# Patient Record
Sex: FEMALE | Race: WHITE | Employment: OTHER | URBAN - METROPOLITAN AREA
[De-identification: names, ages, dates, MRNs, and addresses within clinical notes are randomized per-mention and may not be internally consistent; named-entity substitution may affect disease eponyms.]

---

## 2017-08-21 ENCOUNTER — HOSPITAL ENCOUNTER (EMERGENCY)
Facility: CLINIC | Age: 42
Discharge: HOME | End: 2017-08-21
Attending: EMERGENCY MEDICINE | Admitting: EMERGENCY MEDICINE
Payer: COMMERCIAL

## 2017-08-21 ENCOUNTER — APPOINTMENT (OUTPATIENT)
Dept: ULTRASOUND IMAGING | Facility: CLINIC | Age: 42
End: 2017-08-21
Attending: EMERGENCY MEDICINE
Payer: COMMERCIAL

## 2017-08-21 VITALS
TEMPERATURE: 98.2 F | BODY MASS INDEX: 43.05 KG/M2 | OXYGEN SATURATION: 99 % | DIASTOLIC BLOOD PRESSURE: 89 MMHG | SYSTOLIC BLOOD PRESSURE: 123 MMHG | RESPIRATION RATE: 14 BRPM | WEIGHT: 293 LBS

## 2017-08-21 DIAGNOSIS — R19.7 DIARRHEA, UNSPECIFIED TYPE: ICD-10-CM

## 2017-08-21 DIAGNOSIS — R10.13 ABDOMINAL PAIN, EPIGASTRIC: ICD-10-CM

## 2017-08-21 DIAGNOSIS — E11.65 TYPE 2 DIABETES MELLITUS WITH HYPERGLYCEMIA, UNSPECIFIED LONG TERM INSULIN USE STATUS: ICD-10-CM

## 2017-08-21 DIAGNOSIS — E86.0 DEHYDRATION: ICD-10-CM

## 2017-08-21 LAB
ALBUMIN SERPL-MCNC: 3.8 G/DL (ref 3.4–5)
ALP SERPL-CCNC: 121 U/L (ref 40–150)
ALT SERPL W P-5'-P-CCNC: 91 U/L (ref 0–50)
ANION GAP SERPL CALCULATED.3IONS-SCNC: 8 MMOL/L (ref 3–14)
AST SERPL W P-5'-P-CCNC: 61 U/L (ref 0–45)
BASOPHILS # BLD AUTO: 0.1 10E9/L (ref 0–0.2)
BASOPHILS NFR BLD AUTO: 0.6 %
BILIRUB SERPL-MCNC: 0.3 MG/DL (ref 0.2–1.3)
BUN SERPL-MCNC: 9 MG/DL (ref 7–30)
CALCIUM SERPL-MCNC: 8.5 MG/DL (ref 8.5–10.1)
CHLORIDE SERPL-SCNC: 109 MMOL/L (ref 94–109)
CO2 SERPL-SCNC: 22 MMOL/L (ref 20–32)
CREAT SERPL-MCNC: 0.57 MG/DL (ref 0.52–1.04)
DIFFERENTIAL METHOD BLD: ABNORMAL
EOSINOPHIL # BLD AUTO: 0.2 10E9/L (ref 0–0.7)
EOSINOPHIL NFR BLD AUTO: 2.1 %
ERYTHROCYTE [DISTWIDTH] IN BLOOD BY AUTOMATED COUNT: 13.4 % (ref 10–15)
GFR SERPL CREATININE-BSD FRML MDRD: >90 ML/MIN/1.7M2
GLUCOSE SERPL-MCNC: 176 MG/DL (ref 70–99)
HCG SERPL QL: NEGATIVE
HCT VFR BLD AUTO: 49.3 % (ref 35–47)
HGB BLD-MCNC: 15.8 G/DL (ref 11.7–15.7)
IMM GRANULOCYTES # BLD: 0.1 10E9/L (ref 0–0.4)
IMM GRANULOCYTES NFR BLD: 0.6 %
LIPASE SERPL-CCNC: 119 U/L (ref 73–393)
LYMPHOCYTES # BLD AUTO: 3.3 10E9/L (ref 0.8–5.3)
LYMPHOCYTES NFR BLD AUTO: 36.4 %
MCH RBC QN AUTO: 26.7 PG (ref 26.5–33)
MCHC RBC AUTO-ENTMCNC: 32 G/DL (ref 31.5–36.5)
MCV RBC AUTO: 83 FL (ref 78–100)
MONOCYTES # BLD AUTO: 0.5 10E9/L (ref 0–1.3)
MONOCYTES NFR BLD AUTO: 5.3 %
NEUTROPHILS # BLD AUTO: 5 10E9/L (ref 1.6–8.3)
NEUTROPHILS NFR BLD AUTO: 55 %
NRBC # BLD AUTO: 0 10*3/UL
NRBC BLD AUTO-RTO: 0 /100
PLATELET # BLD AUTO: 290 10E9/L (ref 150–450)
POTASSIUM SERPL-SCNC: 3.5 MMOL/L (ref 3.4–5.3)
PROT SERPL-MCNC: 7.6 G/DL (ref 6.8–8.8)
RBC # BLD AUTO: 5.92 10E12/L (ref 3.8–5.2)
SODIUM SERPL-SCNC: 139 MMOL/L (ref 133–144)
WBC # BLD AUTO: 9 10E9/L (ref 4–11)

## 2017-08-21 PROCEDURE — 85025 COMPLETE CBC W/AUTO DIFF WBC: CPT | Performed by: EMERGENCY MEDICINE

## 2017-08-21 PROCEDURE — 76705 ECHO EXAM OF ABDOMEN: CPT

## 2017-08-21 PROCEDURE — 80053 COMPREHEN METABOLIC PANEL: CPT | Performed by: EMERGENCY MEDICINE

## 2017-08-21 PROCEDURE — 99284 EMERGENCY DEPT VISIT MOD MDM: CPT | Mod: 25

## 2017-08-21 PROCEDURE — 83690 ASSAY OF LIPASE: CPT | Performed by: EMERGENCY MEDICINE

## 2017-08-21 PROCEDURE — 96374 THER/PROPH/DIAG INJ IV PUSH: CPT

## 2017-08-21 PROCEDURE — 25000128 H RX IP 250 OP 636: Performed by: EMERGENCY MEDICINE

## 2017-08-21 PROCEDURE — 96361 HYDRATE IV INFUSION ADD-ON: CPT

## 2017-08-21 PROCEDURE — 84703 CHORIONIC GONADOTROPIN ASSAY: CPT | Performed by: EMERGENCY MEDICINE

## 2017-08-21 RX ORDER — ONDANSETRON 2 MG/ML
4 INJECTION INTRAMUSCULAR; INTRAVENOUS ONCE
Status: COMPLETED | OUTPATIENT
Start: 2017-08-21 | End: 2017-08-21

## 2017-08-21 RX ORDER — SODIUM CHLORIDE 9 MG/ML
1000 INJECTION, SOLUTION INTRAVENOUS CONTINUOUS
Status: DISCONTINUED | OUTPATIENT
Start: 2017-08-21 | End: 2017-08-21 | Stop reason: HOSPADM

## 2017-08-21 RX ORDER — ONDANSETRON 4 MG/1
4 TABLET, FILM COATED ORAL EVERY 8 HOURS PRN
Qty: 10 TABLET | Refills: 0 | Status: SHIPPED | OUTPATIENT
Start: 2017-08-21 | End: 2019-08-02

## 2017-08-21 RX ADMIN — ONDANSETRON 4 MG: 2 INJECTION INTRAMUSCULAR; INTRAVENOUS at 11:26

## 2017-08-21 RX ADMIN — SODIUM CHLORIDE 1000 ML: 9 INJECTION, SOLUTION INTRAVENOUS at 11:21

## 2017-08-21 ASSESSMENT — ENCOUNTER SYMPTOMS
ABDOMINAL PAIN: 1
VOMITING: 0
LIGHT-HEADEDNESS: 0
DIARRHEA: 1
CHILLS: 1
NAUSEA: 1
DIZZINESS: 1
WEAKNESS: 1
FEVER: 0

## 2017-08-21 NOTE — ED PROVIDER NOTES
History     Chief Complaint:  Nausea and Diarrhea    HPI   Winnie Ibarra is a 42 year old female smoker with a history of diabetes mellitus who presents to the emergency department today for evaluation of nausea and diarrhea. The patient reports that on Friday she developed nausea as well as diarrhea that was a beige-yellow color, foamy, and watery. She also states that she has had the chills and upper abdominal cramping and felt feverish, weak, shaky, dizzy, and emotional. She denies vomiting, lightheadedness, or fevers and reports no recent antibiotics or travel. She reports that she is currently on her menstrual period. She notes that her son was recently ill with two episodes of vomiting and diarrhea that was much shorter in duration than her illness. Of note, the patient is hypertensive upon arrival in the emergency department and reports that she has only been noted to be hypertensive towards the end of her pregnancy. The patient reports concern for dehydration and states that she has not been checking her blood sugars regularly.    Allergies:  Adhesive Tape  Erythromycin     Medications:    Metformin HCl  Norco  Ibuprofen    Past Medical History:    Abnormal pap smear  Asthma  Diabetes mellitus  Endometriosis  Gastro-esophageal reflux disease  Pregnancy induced hypertension    Past Surgical History:    Appendectomy  Bladder surgery   section  Knee surgery  Shoulder surgery  Tonsillectomy  Wrist surgery    Family History:    History reviewed. No pertinent family history.    Social History:  The patient was accompanied to the ED by her son.  Smoking Status: Current Every Day Smoker  Alcohol Use: Negative  Marital Status:  Single     Review of Systems   Constitutional: Positive for chills. Negative for fever.        Positive for feverish.   Gastrointestinal: Positive for abdominal pain (upper), diarrhea and nausea. Negative for vomiting.   Neurological: Positive for dizziness and weakness.  "Negative for light-headedness.        Positive for shakiness.   Psychiatric/Behavioral:        Positive for feeling more \"emotional\".   All other systems reviewed and are negative.    Physical Exam     Vital signs:  Temp: 98.2  F (36.8  C)  Temp src: Oral   BP: 123/89   Repeat BP (at 1225): 123/89  Heart Rate: 99   Resp: 14  Weight: 136.1 kg (300 lb)  Estimated body mass index is 43.05 kg/(m^2) as calculated from the following:    Height as of 4/4/15: 1.778 m (5' 10\").    Weight as of this encounter: 136.1 kg (300 lb).    Physical Exam    General: The patient is alert, in no respiratory distress.    HENT: Mucous membranes dry.    Cardiovascular: Regular rate and rhythm. Good pulses in all four extremities. Normal capillary refill and skin turgor.     Respiratory: Lungs are clear. No nasal flaring. No retractions. No wheezing, no crackles.    Gastrointestinal: Abdomen soft. Mild epigastric tenderness. No guarding, no rebound. No palpable hernias.     Musculoskeletal: No gross deformity.     Skin: No rashes or petechiae.     Neurologic: The patient is alert and oriented x3. GCS 15. No testable cranial nerve deficit. Follows commands with clear and appropriate speech. Gives appropriate answers. Good strength in all extremities. No gross neurologic deficit. Gross sensation intact. Pupils are round and reactive. No meningismus.     Lymphatic: No cervical adenopathy. No lower extremity swelling.    Psychiatric: The patient is tearful.    Emergency Department Course     Imaging:  Radiology findings were communicated with the patient who voiced understanding of the findings.    Abdomen US, limited (RUQ only)  1. Moderate diffuse fatty infiltration of the liver.  2. Unremarkable appearance of the gallbladder.  3. No biliary dilatation.  DARLING MEEKS MD  Reading per radiology    Laboratory:  Laboratory findings were communicated with the patient who voiced understanding of the findings.    CBC: WBC 9.0, HGB 15.8 (H), PLT " 290  CMP: Glucose 176 (H), ALT 91 (H), AST 61 (H), o/w WNL (Creatinine 0.57)  Lipase: 119  HCG Qualitative: Negative    Interventions:  1126 Zofran 4 mg IV  1121 NS 1000 mLs IV    Emergency Department Course:  Nursing notes and vitals reviewed.  I performed an exam of the patient as documented above.   IV was inserted and blood was drawn for laboratory testing, results above.  The patient was sent for a Abdomen US, limited (RUQ only) while in the emergency department, results above.   1110 I discussed the patient's elevated blood pressure with her during my initial examination.  1210 I rechecked the patient, who reports she is feeling better. She notes a family history of liver cancer and gall bladder attacks. Her LFT's are elevated at this time so Abdomen US was obtained.  1330 I discussed the treatment plan with the patient. They expressed understanding of this plan and consented to discharge. They will be discharged home with instructions for care and follow up. In addition, the patient will return to the emergency department if their symptoms persist, worsen, if new symptoms arise or if there is any concern.  All questions were answered.  I personally reviewed the imaging and laboratory results with the patient and answered all related questions prior to discharge.    Impression & Plan      Medical Decision Making:  The patient reports that her son has been sick as well at the same time. She complains of abdominal pain with diarrhea and appears to be dehydrated. The patient was hydrated here with IV fluids, feeling much better. I did consider abdominal causes including pancreatitis, intra-thoracic causes, and other causes like biliary colic, UTI. The patient, however, is stable here. We discussed all her results. She is feeling much better after the IV fluids and was discharged home to close follow up and I do not feel that the diarrhea is likely bacterial.    Diagnosis:    ICD-10-CM    1. Abdominal pain,  epigastric R10.13    2. Diarrhea, unspecified type R19.7    3. Dehydration E86.0    4. Type 2 diabetes mellitus with hyperglycemia, unspecified long term insulin use status (H) E11.65      Disposition:  The patient is discharged to home.    Discharge Medications:  Discharge Medication List as of 8/21/2017  1:42 PM      START taking these medications    Details   ondansetron (ZOFRAN) 4 MG tablet Take 1 tablet (4 mg) by mouth every 8 hours as needed for nausea, Disp-10 tablet, R-0, Local Print           Scribe Disclosure:  I, Aristeo Avila, am serving as a scribe at 11:03 AM on 8/21/2017 to document services personally performed by Alexander Agosto MD based on my observations and the provider's statements to me.  [facility] EMERGENCY DEPARTMENT       Alexander Agosto MD  08/22/17 [id number]

## 2017-08-21 NOTE — ED AVS SNAPSHOT
[facility] Emergency Department    [address]    [facility] [address]    Phone:  [phone]    Fax:  [phone]                                       Winnie Ibarra   MRN: [id number]    Department:  [facility] Emergency Department   Date of Visit:  8/21/2017           After Visit Summary Signature Page     I have received my discharge instructions, and my questions have been answered. I have discussed any challenges I see with this plan with the nurse or doctor.    ..........................................................................................................................................  Patient/Patient Representative Signature      ..........................................................................................................................................  Patient Representative Print Name and Relationship to Patient    ..................................................               ................................................  Date                                            Time    ..........................................................................................................................................  Reviewed by Signature/Title    ...................................................              ..............................................  Date                                                            Time

## 2017-08-21 NOTE — ED AVS SNAPSHOT
[facility] Emergency Department    [address] [place] [address]    Phone:  [phone]    Fax:  [phone]                                       Winnie Ibarra   MRN: [id number]    Department:  [facility] Emergency Department   Date of Visit:  8/21/2017           Patient Information     Date Of Birth          1975        Your diagnoses for this visit were:     Abdominal pain, epigastric     Diarrhea, unspecified type     Dehydration     Type 2 diabetes mellitus with hyperglycemia, unspecified long term insulin use status (H)        You were seen by Alexander Agosto MD.      Follow-up Information     Follow up with [facility], [facility]. Schedule an appointment as soon as possible for a visit in 3 days.    Contact information:    [address] [place] [id number]  [phone]          Discharge Instructions       Discharge Instructions  Adult Diarrhea    You have been seen today for diarrhea. This is usually caused by a virus, but some bacteria, parasites, medicines or other medical conditions can cause similar symptoms. At this time your doctor does not find that your diarrhea is a sign of anything dangerous or life-threatening. However, sometimes the signs of serious illness do not show up right away. If you have new or worse symptoms, you may need to be seen again in the Emergency Department or by your primary doctor.     Return to the Emergency Department if:    You feel you are getting dehydrated, such as being very thirsty, not urinating at least every 8-12 hours, or feeling faint or lightheaded.     You develop a new fever, or your fever continues for more than 2 days.     You have belly pain that seems worse than cramps, is in one spot, or is getting worse over time.     You have blood in your stool or your stool becomes black.  (Remember that if you take Pepto-Bismol , this will turn your stool black).     You feel very weak.    You are  "not starting to improve within 24 hours of your visit here.    What can I do to help myself?    The most important thing to do is to drink clear liquids.   It is best to have only small, frequent sips of liquids. Drinking too much at once may cause more diarrhea. You should also replace minerals, sodium and potassium lost with diarrhea. Pedialyte  and sports drinks can help you replace these minerals. You can also drink clear liquids such as water, weak tea, apple juice, and 7-Up . Avoid acid liquids (orange), caffeine (coffee) or alcohol. Milk products will make the diarrhea worse.     Eat only bland foods. Soda crackers, toast, plain noodles, gelatin, applesauce and bananas are good first choices. Avoid foods that have acid, are spicy, fatty or fibrous (such as meats, coarse grains, vegetables). You may start eating these foods again in about 3 days when you are better.     Sometimes treatment includes prescription medicine to prevent diarrhea. If your doctor prescribes these for you, take them as directed.     Nonprescription medicine is available for the treatment of diarrhea and can be very effective. If you use it, make sure you use the dose recommended on the package. Check with your healthcare provider before you use any medicine for diarrhea.     Don t take ibuprofen, or other nonsteroidal anti-inflammatory medicines without checking with your healthcare provider.   Probiotics: If you have been given an antibiotic, you may want to also take a probiotic pill or eat yogurt with live cultures. Probiotics have \"good bacteria\" to help your intestines stay healthy. Studies have shown that probiotics help prevent diarrhea and other intestine problems (including C. diff infection) when you take antibiotics. You can buy these without a prescription in the pharmacy section of the store.   If you were given a prescription for medicine here today, be sure to read all of the information (including the package insert) " that comes with your prescription.  This will include important information about the medicine, its side effects, and any warnings that you need to know about.  The pharmacist who fills the prescription can provide more information and answer questions you may have about the medicine.  If you have questions or concerns that the pharmacist cannot address, please call or return to the Emergency Department.   Opioid Medication Information    Pain medications are among the most commonly prescribed medicines, so we are including this information for all our patients. If you did not receive pain medication or get a prescription for pain medicine, you can ignore it.     You may have been given a prescription for an opioid (narcotic) pain medicine and/or have received a pain medicine while here in the Emergency Department. These medicines can make you drowsy or impaired. You must not drive, operate dangerous equipment, or engage in any other dangerous activities while taking these medications. If you drive while taking these medications, you could be arrested for DUI, or driving under the influence. Do not drink any alcohol while you are taking these medications.     Opioid pain medications can cause addiction. If you have a history of chemical dependency of any type, you are at a higher risk of becoming addicted to pain medications.  Only take these prescribed medications to treat your pain when all other options have been tried. Take it for as short a time and as few doses as possible. Store your pain pills in a secure place, as they are frequently stolen and provide a dangerous opportunity for children or visitors in your house to start abusing these powerful medications. We will not replace any lost or stolen medicine.  As soon as your pain is better, you should flush all your remaining medication.     Many prescription pain medications contain Tylenol  (acetaminophen), including Vicodin , Tylenol #3 , Norco , Lortab ,  and Percocet .  You should not take any extra pills of Tylenol  if you are using these prescription medications or you can get very sick.  Do not ever take more than 3000 mg of acetaminophen in any 24 hour period.    All opioids tend to cause constipation. Drink plenty of water and eat foods that have a lot of fiber, such as fruits, vegetables, prune juice, apple juice and high fiber cereal.  Take a laxative if you don t move your bowels at least every other day. Miralax , Milk of Magnesia, Colace , or Senna  can be used to keep you regular.      Remember that you can always come back to the Emergency Department if you are not able to see your regular doctor in the amount of time listed above, if you get any new symptoms, or if there is anything that worries you.        24 Hour Appointment Hotline       To make an appointment at any [facility], call [phone]). If you don't have a family doctor or clinic, we will help you find one. [facility] clinics are conveniently located to serve the needs of you and your family.             Review of your medicines      START taking        Dose / Directions Last dose taken    ondansetron 4 MG tablet   Commonly known as:  ZOFRAN   Dose:  4 mg   Quantity:  10 tablet        Take 1 tablet (4 mg) by mouth every 8 hours as needed for nausea   Refills:  0          Our records show that you are taking the medicines listed below. If these are incorrect, please call your family doctor or clinic.        Dose / Directions Last dose taken    LUKAS CONTOUR TEST VI        by In Vitro route.   Refills:  0        CONTOUR BLOOD GLUCOSE SYSTEM Rosmery        Refills:  0        GLUMETZA PO        Refills:  0        HYDROcodone-acetaminophen 5-325 MG per tablet   Commonly known as:  NORCO   Dose:  1-2 tablet   Quantity:  9 tablet        Take 1-2 tablets by mouth every 4 hours as needed for moderate to severe pain   Refills:  0        ibuprofen 400-800 mg tablet   Commonly known  as:  ADVIL,MOTRIN   Dose:  400-800 mg   Quantity:  90 tablet        Take 1-2 tablets by mouth every 6 hours as needed (cramping).   Refills:  0        MICROLET LANCETS Misc        Refills:  0        TRULICITY SC        Refills:  0                Prescriptions were sent or printed at these locations (1 Prescription)                   Other Prescriptions                Printed at Department/Unit printer (1 of 1)         ondansetron (ZOFRAN) 4 MG tablet                Procedures and tests performed during your visit     Abdomen US, limited ([facility]Q only)    CBC with platelets differential    Comprehensive metabolic panel    HCG QUALitative    Lipase    Peripheral IV: Standard    Pulse oximetry nursing    Vital signs      Orders Needing Specimen Collection     None      Pending Results     Date and Time Order Name Status Description    8/21/2017 1215 Abdomen US, limited ([facility]Q only) Preliminary             Pending Culture Results     No orders found from 8/19/2017 to 8/22/2017.            Pending Results Instructions     If you had any lab results that were not finalized at the time of your Discharge, you can call the ED Lab Result RN at [phone]. You will be contacted by this team for any positive Lab results or changes in treatment. The nurses are available 7 days a week from 10A to 6:30P.  You can leave a message 24 hours per day and they will return your call.        Test Results From Your Hospital Stay        8/21/2017 11:35 AM      Component Results     Component Value Ref Range & Units Status    WBC 9.0 4.0 - 11.0 10e9/L Final    RBC Count 5.92 (H) 3.8 - 5.2 10e12/L Final    Hemoglobin 15.8 (H) 11.7 - 15.7 g/dL Final    Hematocrit 49.3 (H) 35.0 - 47.0 % Final    MCV 83 78 - 100 fl Final    MCH 26.7 26.5 - 33.0 pg Final    MCHC 32.0 31.5 - 36.5 g/dL Final    RDW 13.4 10.0 - 15.0 % Final    Platelet Count 290 150 - 450 10e9/L Final    Diff Method Automated Method  Final    % Neutrophils 55.0 % Final    % Lymphocytes  36.4 % Final    % Monocytes 5.3 % Final    % Eosinophils 2.1 % Final    % Basophils 0.6 % Final    % Immature Granulocytes 0.6 % Final    Nucleated RBCs 0 0 /100 Final    Absolute Neutrophil 5.0 1.6 - 8.3 10e9/L Final    Absolute Lymphocytes 3.3 0.8 - 5.3 10e9/L Final    Absolute Monocytes 0.5 0.0 - 1.3 10e9/L Final    Absolute Eosinophils 0.2 0.0 - 0.7 10e9/L Final    Absolute Basophils 0.1 0.0 - 0.2 10e9/L Final    Abs Immature Granulocytes 0.1 0 - 0.4 10e9/L Final    Absolute Nucleated RBC 0.0  Final         8/21/2017 11:52 AM      Component Results     Component Value Ref Range & Units Status    Sodium 139 133 - 144 mmol/L Final    Potassium 3.5 3.4 - 5.3 mmol/L Final    Chloride 109 94 - 109 mmol/L Final    Carbon Dioxide 22 20 - 32 mmol/L Final    Anion Gap 8 3 - 14 mmol/L Final    Glucose 176 (H) 70 - 99 mg/dL Final    Urea Nitrogen 9 7 - 30 mg/dL Final    Creatinine 0.57 0.52 - 1.04 mg/dL Final    GFR Estimate >90 >60 mL/min/1.7m2 Final    Non  GFR Calc    GFR Estimate If Black >90 >60 mL/min/1.7m2 Final    African American GFR Calc    Calcium 8.5 8.5 - 10.1 mg/dL Final    Bilirubin Total 0.3 0.2 - 1.3 mg/dL Final    Albumin 3.8 3.4 - 5.0 g/dL Final    Protein Total 7.6 6.8 - 8.8 g/dL Final    Alkaline Phosphatase 121 40 - 150 U/L Final    ALT 91 (H) 0 - 50 U/L Final    AST 61 (H) 0 - 45 U/L Final         8/21/2017 11:52 AM      Component Results     Component Value Ref Range & Units Status    Lipase 119 73 - 393 U/L Final         8/21/2017 11:54 AM      Component Results     Component Value Ref Range & Units Status    HCG Qualitative Serum Negative NEG^Negative Final    This test is for screening purposes.  Results should be interpreted along with   the clinical picture.  Confirmation testing is available if warranted by   ordering [id number], HCG Quantitative Pregnancy.           8/21/2017  1:17 PM      Narrative     ULTRASOUND ABDOMEN LIMITED RIGHT UPPER QUADRANT August 21, 2017 1:07  PM      HISTORY: Abdominal pain. Diarrhea. Elevated liver function tests.    COMPARISON: 8/24/2009-CT abdomen and pelvis.    FINDINGS: Moderate diffuse increased hepatic echogenicity, likely  representing fatty infiltration. No hepatic masses. The gallbladder is  unremarkable without gallstones, wall thickening or pericholecystic  fluid. No focal tenderness over the gallbladder. No intra or  extrahepatic biliary dilatation. The common duct measures 0.3 cm in  diameter. The right kidney has normal echogenicity, measuring 14.1 cm  in length. No right intrarenal collecting system dilatation, calculi  or masses. No free fluid in the upper right hemiabdomen.        Impression     IMPRESSION:   1. Moderate diffuse fatty infiltration of the liver.  2. Unremarkable appearance of the gallbladder.  3. No biliary dilatation.                Clinical Quality Measure: Blood Pressure Screening     Your blood pressure was checked while you were in the emergency department today. The last reading we obtained was  BP: 123/89 . Please read the guidelines below about what these numbers mean and what you should do about them.  If your systolic blood pressure (the top number) is less than 120 and your diastolic blood pressure (the bottom number) is less than 80, then your blood pressure is normal. There is nothing more that you need to do about it.  If your systolic blood pressure (the top number) is 120-139 or your diastolic blood pressure (the bottom number) is 80-89, your blood pressure may be higher than it should be. You should have your blood pressure rechecked within a year by a primary care provider.  If your systolic blood pressure (the top number) is 140 or greater or your diastolic blood pressure (the bottom number) is 90 or greater, you may have high blood pressure. High blood pressure is treatable, but if left untreated over time it can put you at risk for heart attack, stroke, or kidney failure. You should have your blood  "pressure rechecked by a primary care provider within the next 4 weeks.  If your provider in the emergency department today gave you specific instructions to follow-up with your doctor or provider even sooner than that, you should follow that instruction and not wait for up to 4 weeks for your follow-up visit.        Thank you for choosing [facility]       Thank you for choosing [facility] for your care. Our goal is always to provide you with excellent care. Hearing back from our patients is one way we can continue to improve our services. Please take a few minutes to complete the written survey that you may receive in the mail after you visit with us. Thank you!        [employer] Information     [employer] lets you send messages to your doctor, view your test results, renew your prescriptions, schedule appointments and more. To sign up, go to [email or web address] . Click on \"Log in\" on the left side of the screen, which will take you to the Welcome page. Then click on \"Sign up Now\" on the right side of the page.     You will be asked to enter the access code listed below, as well as some personal information. Please follow the directions to create your username and password.     Your access code is: [email or web address]  Expires: 2017  1:41 PM     Your access code will  in 90 days. If you need help or a new code, please call your [facility] clinic or [phone].        Care EveryWhere ID     This is your Care EveryWhere ID. This could be used by other organizations to access your [facility] medical records  [id number]        Equal Access to Services     CHACHO NOVAK : Hadii buck daigle hadasho Soelyssaali, waaxda luqadaha, qaybta kaalmada frederic, susana peguero . So [facility] [phone].    ATENCIÓN: Si habla español, tiene a stiles disposición servicios gratuitos de asistencia lingüística. Llame al [phone].    We comply with applicable federal civil rights laws and [place] laws. We do not " discriminate on the basis of race, color, national origin, age, disability sex, sexual orientation or gender identity.            After Visit Summary       This is your record. Keep this with you and show to your community pharmacist(s) and doctor(s) at your next visit.

## 2018-05-10 ENCOUNTER — APPOINTMENT (OUTPATIENT)
Dept: CT IMAGING | Facility: CLINIC | Age: 43
End: 2018-05-10
Attending: EMERGENCY MEDICINE
Payer: MEDICAID

## 2018-05-10 ENCOUNTER — HOSPITAL ENCOUNTER (EMERGENCY)
Facility: CLINIC | Age: 43
Discharge: HOME | End: 2018-05-10
Attending: EMERGENCY MEDICINE | Admitting: EMERGENCY MEDICINE
Payer: MEDICAID

## 2018-05-10 VITALS
OXYGEN SATURATION: 93 % | RESPIRATION RATE: 16 BRPM | HEART RATE: 89 BPM | TEMPERATURE: 97.5 F | SYSTOLIC BLOOD PRESSURE: 135 MMHG | DIASTOLIC BLOOD PRESSURE: 87 MMHG

## 2018-05-10 DIAGNOSIS — R10.9 FLANK PAIN: ICD-10-CM

## 2018-05-10 LAB
ALBUMIN SERPL-MCNC: 3.2 G/DL (ref 3.4–5)
ALBUMIN UR-MCNC: 30 MG/DL
ALP SERPL-CCNC: 104 U/L (ref 40–150)
ALT SERPL W P-5'-P-CCNC: 32 U/L (ref 0–50)
ANION GAP SERPL CALCULATED.3IONS-SCNC: 6 MMOL/L (ref 3–14)
APPEARANCE UR: ABNORMAL
AST SERPL W P-5'-P-CCNC: 24 U/L (ref 0–45)
BASOPHILS # BLD AUTO: 0 10E9/L (ref 0–0.2)
BASOPHILS NFR BLD AUTO: 0.4 %
BILIRUB SERPL-MCNC: 0.3 MG/DL (ref 0.2–1.3)
BILIRUB UR QL STRIP: NEGATIVE
BUN SERPL-MCNC: 8 MG/DL (ref 7–30)
CALCIUM SERPL-MCNC: 8.2 MG/DL (ref 8.5–10.1)
CHLORIDE SERPL-SCNC: 107 MMOL/L (ref 94–109)
CO2 SERPL-SCNC: 24 MMOL/L (ref 20–32)
COLOR UR AUTO: YELLOW
CREAT SERPL-MCNC: 0.48 MG/DL (ref 0.52–1.04)
DIFFERENTIAL METHOD BLD: ABNORMAL
EOSINOPHIL # BLD AUTO: 0.1 10E9/L (ref 0–0.7)
EOSINOPHIL NFR BLD AUTO: 1.6 %
ERYTHROCYTE [DISTWIDTH] IN BLOOD BY AUTOMATED COUNT: 12.8 % (ref 10–15)
GFR SERPL CREATININE-BSD FRML MDRD: >90 ML/MIN/1.7M2
GLUCOSE SERPL-MCNC: 175 MG/DL (ref 70–99)
GLUCOSE UR STRIP-MCNC: 50 MG/DL
HCG UR QL: NEGATIVE
HCT VFR BLD AUTO: 43.3 % (ref 35–47)
HGB BLD-MCNC: 14.2 G/DL (ref 11.7–15.7)
HGB UR QL STRIP: NEGATIVE
IMM GRANULOCYTES # BLD: 0 10E9/L (ref 0–0.4)
IMM GRANULOCYTES NFR BLD: 0.5 %
KETONES UR STRIP-MCNC: NEGATIVE MG/DL
LEUKOCYTE ESTERASE UR QL STRIP: NEGATIVE
LIPASE SERPL-CCNC: 254 U/L (ref 73–393)
LYMPHOCYTES # BLD AUTO: 2.8 10E9/L (ref 0.8–5.3)
LYMPHOCYTES NFR BLD AUTO: 34.5 %
MCH RBC QN AUTO: 27 PG (ref 26.5–33)
MCHC RBC AUTO-ENTMCNC: 32.8 G/DL (ref 31.5–36.5)
MCV RBC AUTO: 82 FL (ref 78–100)
MONOCYTES # BLD AUTO: 0.5 10E9/L (ref 0–1.3)
MONOCYTES NFR BLD AUTO: 6 %
MUCOUS THREADS #/AREA URNS LPF: PRESENT /LPF
NEUTROPHILS # BLD AUTO: 4.6 10E9/L (ref 1.6–8.3)
NEUTROPHILS NFR BLD AUTO: 57 %
NITRATE UR QL: NEGATIVE
NRBC # BLD AUTO: 0 10*3/UL
NRBC BLD AUTO-RTO: 0 /100
PH UR STRIP: 5 PH (ref 5–7)
PLATELET # BLD AUTO: 229 10E9/L (ref 150–450)
POTASSIUM SERPL-SCNC: 4 MMOL/L (ref 3.4–5.3)
PROT SERPL-MCNC: 6.9 G/DL (ref 6.8–8.8)
RBC # BLD AUTO: 5.26 10E12/L (ref 3.8–5.2)
RBC #/AREA URNS AUTO: 1 /HPF (ref 0–2)
SODIUM SERPL-SCNC: 137 MMOL/L (ref 133–144)
SOURCE: ABNORMAL
SP GR UR STRIP: 1.02 (ref 1–1.03)
SQUAMOUS #/AREA URNS AUTO: 1 /HPF (ref 0–1)
UROBILINOGEN UR STRIP-MCNC: 2 MG/DL (ref 0–2)
WBC # BLD AUTO: 8 10E9/L (ref 4–11)
WBC #/AREA URNS AUTO: 1 /HPF (ref 0–5)

## 2018-05-10 PROCEDURE — 85025 COMPLETE CBC W/AUTO DIFF WBC: CPT | Performed by: EMERGENCY MEDICINE

## 2018-05-10 PROCEDURE — 25000132 ZZH RX MED GY IP 250 OP 250 PS 637: Performed by: PHYSICIAN ASSISTANT

## 2018-05-10 PROCEDURE — 99285 EMERGENCY DEPT VISIT HI MDM: CPT | Mod: 25

## 2018-05-10 PROCEDURE — 83690 ASSAY OF LIPASE: CPT | Performed by: EMERGENCY MEDICINE

## 2018-05-10 PROCEDURE — 81001 URINALYSIS AUTO W/SCOPE: CPT | Performed by: EMERGENCY MEDICINE

## 2018-05-10 PROCEDURE — 81025 URINE PREGNANCY TEST: CPT | Performed by: EMERGENCY MEDICINE

## 2018-05-10 PROCEDURE — 74176 CT ABD & PELVIS W/O CONTRAST: CPT

## 2018-05-10 PROCEDURE — 96374 THER/PROPH/DIAG INJ IV PUSH: CPT

## 2018-05-10 PROCEDURE — 80053 COMPREHEN METABOLIC PANEL: CPT | Performed by: EMERGENCY MEDICINE

## 2018-05-10 PROCEDURE — 25000128 H RX IP 250 OP 636: Performed by: EMERGENCY MEDICINE

## 2018-05-10 RX ORDER — IBUPROFEN 600 MG/1
600 TABLET, FILM COATED ORAL ONCE
Status: COMPLETED | OUTPATIENT
Start: 2018-05-10 | End: 2018-05-10

## 2018-05-10 RX ORDER — OXYCODONE AND ACETAMINOPHEN 5; 325 MG/1; MG/1
1-2 TABLET ORAL EVERY 4 HOURS PRN
Qty: 12 TABLET | Refills: 0 | Status: SHIPPED | OUTPATIENT
Start: 2018-05-10 | End: 2019-08-02

## 2018-05-10 RX ORDER — CYCLOBENZAPRINE HCL 10 MG
10 TABLET ORAL 3 TIMES DAILY PRN
Qty: 20 TABLET | Refills: 0 | Status: SHIPPED | OUTPATIENT
Start: 2018-05-10 | End: 2018-05-16

## 2018-05-10 RX ORDER — HYDROCODONE BITARTRATE AND ACETAMINOPHEN 5; 325 MG/1; MG/1
2 TABLET ORAL ONCE
Status: COMPLETED | OUTPATIENT
Start: 2018-05-10 | End: 2018-05-10

## 2018-05-10 RX ORDER — HYDROMORPHONE HYDROCHLORIDE 1 MG/ML
0.5 INJECTION, SOLUTION INTRAMUSCULAR; INTRAVENOUS; SUBCUTANEOUS
Status: DISCONTINUED | OUTPATIENT
Start: 2018-05-10 | End: 2018-05-10 | Stop reason: HOSPADM

## 2018-05-10 RX ADMIN — HYDROMORPHONE HYDROCHLORIDE 0.5 MG: 1 INJECTION, SOLUTION INTRAMUSCULAR; INTRAVENOUS; SUBCUTANEOUS at 11:37

## 2018-05-10 RX ADMIN — IBUPROFEN 600 MG: 600 TABLET ORAL at 09:54

## 2018-05-10 RX ADMIN — HYDROCODONE BITARTRATE AND ACETAMINOPHEN 2 TABLET: 5; 325 TABLET ORAL at 09:54

## 2018-05-10 ASSESSMENT — ENCOUNTER SYMPTOMS
DYSURIA: 0
FLANK PAIN: 1
DIAPHORESIS: 1
FEVER: 1
HEMATURIA: 0
VOMITING: 0
NAUSEA: 1
DIARRHEA: 1
CHILLS: 1
ABDOMINAL PAIN: 1

## 2018-05-10 NOTE — ED AVS SNAPSHOT
[facility] Emergency Department    [address]    [facility] [address]    Phone:  [phone]    Fax:  [phone]                                       Winnie Ibarra   MRN: [id number]    Department:  [facility] Emergency Department   Date of Visit:  5/10/2018           After Visit Summary Signature Page     I have received my discharge instructions, and my questions have been answered. I have discussed any challenges I see with this plan with the nurse or doctor.    ..........................................................................................................................................  Patient/Patient Representative Signature      ..........................................................................................................................................  Patient Representative Print Name and Relationship to Patient    ..................................................               ................................................  Date                                            Time    ..........................................................................................................................................  Reviewed by Signature/Title    ...................................................              ..............................................  Date                                                            Time

## 2018-05-10 NOTE — ED AVS SNAPSHOT
[facility] Emergency Department    [address] [place] [address]    Phone:  [phone]    Fax:  [phone]                                       Winnie Ibarra   MRN: [id number]    Department:  [facility] Emergency Department   Date of Visit:  5/10/2018           Patient Information     Date Of Birth          1975        Your diagnoses for this visit were:     Flank pain Likely muscular strain       You were seen by Dino Larson MD.      Follow-up Information     Follow up with Clinic, [facility] In 1 day.    Why:  If symptoms have not improved or they worsen    Contact information:    [address] [facility] [address]  [phone]          Discharge Instructions       Use heat, pain medications, and gentle stretching for back pain. Increase fluids with pain medications to prevent constipation. Do not drive or operate machinery while on the pain medications.       Discharge Instructions  Back Pain  You were seen today for back pain. Back pain can have many causes, but most will get better without surgery or other specific treatment. Sometimes there is a herniated ( slipped ) disc. We don t usually do MRI scans to look for these right away, since most herniated discs will get better on their own with time.  Today, we did not find any evidence that your back pain was caused by a serious condition, such as an infection, fracture, or tumor. However, sometimes symptoms develop over time and cannot be found during an emergency visit, so it is very important that you follow up with your primary doctor.  Return to the Emergency Department if:    You develop a fever with your back pain.     You have weakness or change in sensation in one or both legs.    You lose control of your bowels or bladder, or can t empty your bladder.    Your pain gets much worse.     Follow-up with your doctor:    Unless your pain has completely gone away, please make  an appointment with your doctor within one week.  You may need further management of your back pain, such as more pain medication, imaging such as an X-ray or MRI, or physical therapy.    What can I do to help myself?    Remain Active -- People are often afraid that they will hurt their back further or delay recovery by remaining active, but this is one of the best things you can do for your back. In fact, prolonged bed rest is not recommended. Studies have shown that people with low back pain recover faster when they remain active. Movement helps to bring blood flow to the muscles and relieve muscle spasms as well as preventing loss of muscle strength.    Heat -- Using a heating pad can help with low back pain during the first few weeks. Do not sleep with a heating pad, as you can be burned.     Pain medications - You may take a pain medication such as Tylenol  (acetaminophen), Advil , Nuprin  (ibuprofen) or Aleve  (naproxen).  If you have been given a narcotic such as Vicodin  (hydrocodone with acetaminophen), Percocet  (oxycodone with acetaminophen), codeine, or a muscle relaxant such as Flexeril  (cyclobenzaprine) or Soma  (carisoprodol), do not drive for four hours after you have taken it. If the narcotic contains Tylenol  (acetaminophen), do not take Tylenol  with it. All narcotics will cause constipation, so eat a high fiber diet.   If you were given a prescription for medicine here today, be sure to read all of the information (including the package insert) that comes with your prescription.  This will include important information about the medicine, its side effects, and any warnings that you need to know about.  The pharmacist who fills the prescription can provide more information and answer questions you may have about the medicine.  If you have questions or concerns that the pharmacist cannot address, please call or return to the Emergency Department.   Opioid Medication Information    Pain medications  are among the most commonly prescribed medicines, so we are including this information for all our patients. If you did not receive pain medication or get a prescription for pain medicine, you can ignore it.     You may have been given a prescription for an opioid (narcotic) pain medicine and/or have received a pain medicine while here in the Emergency Department. These medicines can make you drowsy or impaired. You must not drive, operate dangerous equipment, or engage in any other dangerous activities while taking these medications. If you drive while taking these medications, you could be arrested for DUI, or driving under the influence. Do not drink any alcohol while you are taking these medications.     Opioid pain medications can cause addiction. If you have a history of chemical dependency of any type, you are at a higher risk of becoming addicted to pain medications.  Only take these prescribed medications to treat your pain when all other options have been tried. Take it for as short a time and as few doses as possible. Store your pain pills in a secure place, as they are frequently stolen and provide a dangerous opportunity for children or visitors in your house to start abusing these powerful medications. We will not replace any lost or stolen medicine.  As soon as your pain is better, you should flush all your remaining medication.     Many prescription pain medications contain Tylenol  (acetaminophen), including Vicodin , Tylenol #3 , Norco , Lortab , and Percocet .  You should not take any extra pills of Tylenol  if you are using these prescription medications or you can get very sick.  Do not ever take more than 3000 mg of acetaminophen in any 24 hour period.    All opioids tend to cause constipation. Drink plenty of water and eat foods that have a lot of fiber, such as fruits, vegetables, prune juice, apple juice and high fiber cereal.  Take a laxative if you don t move your bowels at least every  other day. Miralax , Milk of Magnesia, Colace , or Senna  can be used to keep you regular.      Remember that you can always come back to the Emergency Department if you are not able to see your regular doctor in the amount of time listed above, if you get any new symptoms, or if there is anything that worries you.      Discharge References/Attachments     FLANK PAIN, UNCERTAIN CAUSE (ENGLISH)      24 Hour Appointment Hotline       To make an appointment at any [facility], call [phone] ([phone]). If you don't have a family doctor or clinic, we will help you find one. [facility] clinics are conveniently located to serve the needs of you and your family.             Review of your medicines      START taking        Dose / Directions Last dose taken    cyclobenzaprine 10 MG tablet   Commonly known as:  FLEXERIL   Dose:  10 mg   Quantity:  20 tablet        Take 1 tablet (10 mg) by mouth 3 times daily as needed for muscle spasms   Refills:  0        oxyCODONE-acetaminophen 5-325 MG per tablet   Commonly known as:  PERCOCET   Dose:  1-2 tablet   Quantity:  12 tablet        Take 1-2 tablets by mouth every 4 hours as needed for pain   Refills:  0          Our records show that you are taking the medicines listed below. If these are incorrect, please call your family doctor or clinic.        Dose / Directions Last dose taken    LUKAS CONTOUR TEST VI        by In Vitro route.   Refills:  0        CONTOUR BLOOD GLUCOSE SYSTEM Rosmery        Refills:  0        GLUMETZA PO        Refills:  0        HYDROcodone-acetaminophen 5-325 MG per tablet   Commonly known as:  NORCO   Dose:  1-2 tablet   Quantity:  9 tablet        Take 1-2 tablets by mouth every 4 hours as needed for moderate to severe pain   Refills:  0        ibuprofen 400-800 mg tablet   Commonly known as:  ADVIL,MOTRIN   Dose:  400-800 mg   Quantity:  90 tablet        Take 1-2 tablets by mouth every 6 hours as needed (cramping).   Refills:  0         MICROLET LANCETS Misc        Refills:  0        ondansetron 4 MG tablet   Commonly known as:  ZOFRAN   Dose:  4 mg   Quantity:  10 tablet        Take 1 tablet (4 mg) by mouth every 8 hours as needed for nausea   Refills:  0        TRULICITY SC        Refills:  0                Information about OPIOIDS     PRESCRIPTION OPIOIDS: WHAT YOU NEED TO KNOW   You have a prescription for an opioid (narcotic) pain medicine. Opioids can cause addiction. If you have a history of chemical dependency of any type, you are at a higher risk of becoming addicted to opioids. Only take this medicine after all other options have been tried. Take it for as short a time and as few doses as possible.     Do not:    Drive. If you drive while taking these medicines, you could be arrested for driving under the influence (DUI).    Operate heavy machinery    Do any other dangerous activities while taking these medicines.     Drink any alcohol while taking these medicines.      Take with any other medicines that contain acetaminophen. Read all labels carefully. Look for the word  acetaminophen  or  Tylenol.  Ask your pharmacist if you have questions or are unsure.    Store your pills in a secure place, locked if possible. We will not replace any lost or stolen medicine. If you don t finish your medicine, please throw away (dispose) as directed by your pharmacist. The [place] [employer] has more information about safe disposal: https://www.pca.[email or web address].[place].us/living-green/managing-unwanted-medications    All opioids tend to cause constipation. Drink plenty of water and eat foods that have a lot of fiber, such as fruits, vegetables, prune juice, apple juice and high-fiber cereal. Take a laxative (Miralax, milk of magnesia, Colace, Senna) if you don t move your bowels at least every other day.         Prescriptions were sent or printed at these locations (2 Prescriptions)                   Other Prescriptions                Printed at  Department/Unit printer (2 of 2)         cyclobenzaprine (FLEXERIL) 10 MG tablet               oxyCODONE-acetaminophen (PERCOCET) 5-325 MG per tablet                Procedures and tests performed during your visit     CBC with platelets differential    CT Abdomen Pelvis w/o Contrast    Comprehensive metabolic panel    HCG qualitative urine (UPT)    Lipase    Peripheral IV catheter    UA reflex to Microscopic      Orders Needing Specimen Collection     None      Pending Results     No orders found from 5/8/2018 to 5/11/2018.            Pending Culture Results     No orders found from 5/8/2018 to 5/11/2018.            Pending Results Instructions     If you had any lab results that were not finalized at the time of your Discharge, you can call the ED Lab Result RN at [phone]. You will be contacted by this team for any positive Lab results or changes in treatment. The nurses are available 7 days a week from 10A to 6:30P.  You can leave a message 24 hours per day and they will return your call.        Test Results From Your Hospital Stay        5/10/2018  9:40 AM      Component Results     Component Value Ref Range & Units Status    Color Urine Yellow  Final    Appearance Urine Slightly Cloudy  Final    Glucose Urine 50 (A) NEG^Negative mg/dL Final    Bilirubin Urine Negative NEG^Negative Final    Ketones Urine Negative NEG^Negative mg/dL Final    Specific Gravity Urine 1.021 1.003 - 1.035 Final    Blood Urine Negative NEG^Negative Final    pH Urine 5.0 5.0 - 7.0 pH Final    Protein Albumin Urine 30 (A) NEG^Negative mg/dL Final    Urobilinogen mg/dL 2.0 0.0 - 2.0 mg/dL Final    Nitrite Urine Negative NEG^Negative Final    Leukocyte Esterase Urine Negative NEG^Negative Final    Source Midstream Urine  Final    RBC Urine 1 0 - 2 /HPF Final    WBC Urine 1 0 - 5 /HPF Final    Squamous Epithelial /HPF Urine 1 0 - 1 /HPF Final    Mucous Urine Present (A) NEG^Negative /LPF Final         5/10/2018  9:57 AM      Component  Results     Component Value Ref Range & Units Status    HCG Qual Urine Negative NEG^Negative Final    This test is for screening purposes.  Results should be interpreted along with   the clinical picture.  Confirmation testing is available if warranted by   ordering [id number], HCG Quantitative Pregnancy.           5/10/2018 10:56 AM      Component Results     Component Value Ref Range & Units Status    WBC 8.0 4.0 - 11.0 10e9/L Final    RBC Count 5.26 (H) 3.8 - 5.2 10e12/L Final    Hemoglobin 14.2 11.7 - 15.7 g/dL Final    Hematocrit 43.3 35.0 - 47.0 % Final    MCV 82 78 - 100 fl Final    MCH 27.0 26.5 - 33.0 pg Final    MCHC 32.8 31.5 - 36.5 g/dL Final    RDW 12.8 10.0 - 15.0 % Final    Platelet Count 229 150 - 450 10e9/L Final    Diff Method Automated Method  Final    % Neutrophils 57.0 % Final    % Lymphocytes 34.5 % Final    % Monocytes 6.0 % Final    % Eosinophils 1.6 % Final    % Basophils 0.4 % Final    % Immature Granulocytes 0.5 % Final    Nucleated RBCs 0 0 /100 Final    Absolute Neutrophil 4.6 1.6 - 8.3 10e9/L Final    Absolute Lymphocytes 2.8 0.8 - 5.3 10e9/L Final    Absolute Monocytes 0.5 0.0 - 1.3 10e9/L Final    Absolute Eosinophils 0.1 0.0 - 0.7 10e9/L Final    Absolute Basophils 0.0 0.0 - 0.2 10e9/L Final    Abs Immature Granulocytes 0.0 0 - 0.4 10e9/L Final    Absolute Nucleated RBC 0.0  Final         5/10/2018 11:20 AM      Component Results     Component Value Ref Range & Units Status    Sodium 137 133 - 144 mmol/L Final    Potassium 4.0 3.4 - 5.3 mmol/L Final    Chloride 107 94 - 109 mmol/L Final    Carbon Dioxide 24 20 - 32 mmol/L Final    Anion Gap 6 3 - 14 mmol/L Final    Glucose 175 (H) 70 - 99 mg/dL Final    Urea Nitrogen 8 7 - 30 mg/dL Final    Creatinine 0.48 (L) 0.52 - 1.04 mg/dL Final    GFR Estimate >90 >60 mL/min/1.7m2 Final    Non  GFR Calc    GFR Estimate If Black >90 >60 mL/min/1.7m2 Final    African American GFR Calc    Calcium 8.2 (L) 8.5 - 10.1 mg/dL Final     Bilirubin Total 0.3 0.2 - 1.3 mg/dL Final    Albumin 3.2 (L) 3.4 - 5.0 g/dL Final    Protein Total 6.9 6.8 - 8.8 g/dL Final    Alkaline Phosphatase 104 40 - 150 U/L Final    ALT 32 0 - 50 U/L Final    AST 24 0 - 45 U/L Final         5/10/2018 11:17 AM      Component Results     Component Value Ref Range & Units Status    Lipase 254 73 - 393 U/L Final         5/10/2018 11:43 AM      Narrative     CT ABDOMEN AND PELVIS WITHOUT CONTRAST  5/10/2018 11:05 AM     HISTORY: Right flank pain.     TECHNIQUE: Axial images are obtained from the lung bases to the  symphysis without oral or IV contrast.  Coronal reformatted images are  also generated.  Radiation dose for this scan was reduced using  automated exposure control, adjustment of the mA and/or kV according  to patient size, or iterative reconstruction technique.    FINDINGS: The lung bases are clear.    Abdomen: There is diffuse fatty liver infiltration. Liver is enlarged  measuring nearly 20 cm in AP dimension. The spleen, gallbladder,  appendix, adrenal glands and kidneys are unremarkable. No  hydronephrosis or renal calculi. The bowel is normal in caliber  without obstruction or diverticulitis. Appendix not visualized. Small  fat-containing umbilical hernia is present.    Pelvis: Multiple right ovarian cystic lesions are present. Right ovary  measures approximately 5.0 x 3.6 cm while the left measures  approximately 4.8 x 2.6 cm. The bladder, uterus and rectum are  unremarkable. No enlarged pelvic lymph nodes or free fluid. A few  small pelvic and inguinal lymph nodes are present but none are  enlarged by CT criteria. Bone window examination is unremarkable.        Impression     IMPRESSION:  1. Hepatomegaly with diffuse fatty infiltration of the liver. No  urinary tract calculi or hydronephrosis.  2. Right ovarian enlargement compared to the contralateral left ovary.  Multiple cystic areas are present. Follow-up pelvic ultrasound for  further assessment if  clinically warranted.  3. No bowel obstruction or diverticulitis.    RAUDEL PRECIADO MD                Clinical Quality Measure: Blood Pressure Screening     Your blood pressure was checked while you were in the emergency department today. The last reading we obtained was  BP: 135/87 . Please read the guidelines below about what these numbers mean and what you should do about them.  If your systolic blood pressure (the top number) is less than 120 and your diastolic blood pressure (the bottom number) is less than 80, then your blood pressure is normal. There is nothing more that you need to do about it.  If your systolic blood pressure (the top number) is 120-139 or your diastolic blood pressure (the bottom number) is 80-89, your blood pressure may be higher than it should be. You should have your blood pressure rechecked within a year by a primary care provider.  If your systolic blood pressure (the top number) is 140 or greater or your diastolic blood pressure (the bottom number) is 90 or greater, you may have high blood pressure. High blood pressure is treatable, but if left untreated over time it can put you at risk for heart attack, stroke, or kidney failure. You should have your blood pressure rechecked by a primary care provider within the next 4 weeks.  If your provider in the emergency department today gave you specific instructions to follow-up with your doctor or provider even sooner than that, you should follow that instruction and not wait for up to 4 weeks for your follow-up visit.        Thank you for choosing [facility]       Thank you for choosing [facility] for your care. Our goal is always to provide you with excellent care. Hearing back from our patients is one way we can continue to improve our services. Please take a few minutes to complete the written survey that you may receive in the mail after you visit with us. Thank you!        [employer] Information     [employer] lets you send messages to your  "doctor, view your test results, renew your prescriptions, schedule appointments and more. To sign up, go to [email or web address] . Click on \"Log in\" on the left side of the screen, which will take you to the Welcome page. Then click on \"Sign up Now\" on the right side of the page.     You will be asked to enter the access code listed below, as well as some personal information. Please follow the directions to create your username and password.     Your access code is: [email or web address]  Expires: 2018 12:29 PM     Your access code will  in 90 days. If you need help or a new code, please call your [facility] clinic or [phone].        Care EveryWhere ID     This is your Care EveryWhere ID. This could be used by other organizations to access your [facility] medical records  [id number]        Equal Access to Services     CHACHO NOVAK : Hadii buck Michael, wajoseline dawkins, qaanni kaalmada frederic, susana peguero . So [facility] [phone].    ATENCIÓN: Si habla español, tiene a stiles disposición servicios gratuitos de asistencia lingüística. Llame al [phone].    We comply with applicable federal civil rights laws and [place] laws. We do not discriminate on the basis of race, color, national origin, age, disability, sex, sexual orientation, or gender identity.            After Visit Summary       This is your record. Keep this with you and show to your community pharmacist(s) and doctor(s) at your next visit.                  "

## 2018-05-10 NOTE — DISCHARGE INSTRUCTIONS
Use heat, pain medications, and gentle stretching for back pain. Increase fluids with pain medications to prevent constipation. Do not drive or operate machinery while on the pain medications.       Discharge Instructions  Back Pain  You were seen today for back pain. Back pain can have many causes, but most will get better without surgery or other specific treatment. Sometimes there is a herniated ( slipped ) disc. We don t usually do MRI scans to look for these right away, since most herniated discs will get better on their own with time.  Today, we did not find any evidence that your back pain was caused by a serious condition, such as an infection, fracture, or tumor. However, sometimes symptoms develop over time and cannot be found during an emergency visit, so it is very important that you follow up with your primary doctor.  Return to the Emergency Department if:    You develop a fever with your back pain.     You have weakness or change in sensation in one or both legs.    You lose control of your bowels or bladder, or can t empty your bladder.    Your pain gets much worse.     Follow-up with your doctor:    Unless your pain has completely gone away, please make an appointment with your doctor within one week.  You may need further management of your back pain, such as more pain medication, imaging such as an X-ray or MRI, or physical therapy.    What can I do to help myself?    Remain Active -- People are often afraid that they will hurt their back further or delay recovery by remaining active, but this is one of the best things you can do for your back. In fact, prolonged bed rest is not recommended. Studies have shown that people with low back pain recover faster when they remain active. Movement helps to bring blood flow to the muscles and relieve muscle spasms as well as preventing loss of muscle strength.    Heat -- Using a heating pad can help with low back pain during the first few weeks. Do not  sleep with a heating pad, as you can be burned.     Pain medications - You may take a pain medication such as Tylenol  (acetaminophen), Advil , Nuprin  (ibuprofen) or Aleve  (naproxen).  If you have been given a narcotic such as Vicodin  (hydrocodone with acetaminophen), Percocet  (oxycodone with acetaminophen), codeine, or a muscle relaxant such as Flexeril  (cyclobenzaprine) or Soma  (carisoprodol), do not drive for four hours after you have taken it. If the narcotic contains Tylenol  (acetaminophen), do not take Tylenol  with it. All narcotics will cause constipation, so eat a high fiber diet.   If you were given a prescription for medicine here today, be sure to read all of the information (including the package insert) that comes with your prescription.  This will include important information about the medicine, its side effects, and any warnings that you need to know about.  The pharmacist who fills the prescription can provide more information and answer questions you may have about the medicine.  If you have questions or concerns that the pharmacist cannot address, please call or return to the Emergency Department.   Opioid Medication Information    Pain medications are among the most commonly prescribed medicines, so we are including this information for all our patients. If you did not receive pain medication or get a prescription for pain medicine, you can ignore it.     You may have been given a prescription for an opioid (narcotic) pain medicine and/or have received a pain medicine while here in the Emergency Department. These medicines can make you drowsy or impaired. You must not drive, operate dangerous equipment, or engage in any other dangerous activities while taking these medications. If you drive while taking these medications, you could be arrested for DUI, or driving under the influence. Do not drink any alcohol while you are taking these medications.     Opioid pain medications can cause  addiction. If you have a history of chemical dependency of any type, you are at a higher risk of becoming addicted to pain medications.  Only take these prescribed medications to treat your pain when all other options have been tried. Take it for as short a time and as few doses as possible. Store your pain pills in a secure place, as they are frequently stolen and provide a dangerous opportunity for children or visitors in your house to start abusing these powerful medications. We will not replace any lost or stolen medicine.  As soon as your pain is better, you should flush all your remaining medication.     Many prescription pain medications contain Tylenol  (acetaminophen), including Vicodin , Tylenol #3 , Norco , Lortab , and Percocet .  You should not take any extra pills of Tylenol  if you are using these prescription medications or you can get very sick.  Do not ever take more than 3000 mg of acetaminophen in any 24 hour period.    All opioids tend to cause constipation. Drink plenty of water and eat foods that have a lot of fiber, such as fruits, vegetables, prune juice, apple juice and high fiber cereal.  Take a laxative if you don t move your bowels at least every other day. Miralax , Milk of Magnesia, Colace , or Senna  can be used to keep you regular.      Remember that you can always come back to the Emergency Department if you are not able to see your regular doctor in the amount of time listed above, if you get any new symptoms, or if there is anything that worries you.

## 2018-05-10 NOTE — ED TRIAGE NOTES
Arrives with right back/flank pain since yesterday, denies other symptoms, alert and oriented, ABCs intact.

## 2018-05-10 NOTE — ED PROVIDER NOTES
History     Chief Complaint:  Back Pain      The history is provided by the patient.      Winnie Ibarra is a 42 year old female with a history of DM and UTIs who presents to the ED for evaluation of back pain. The patient's right flank pain suddenly onset yesterday afternoon. She tried tylenol without relief. This morning, she was unable to eat due to nausea. She came in today due to 6/10 pain with nausea, diarrhea, abdominal pain, chills, subjective fever, and diaphoresis.    Here in the ED, she denies dysuria, hematuria, vaginal bleeding, vomiting, vaginal discharge, or rash. She denies abnormal movement/trauma to her right flank. She does have a history of kidney stones but she is unsure if her symptoms are similar. Her last period was 4/20.    Allergies:  Adhesive Tape  Erythromycin    Medications:    Blood glucose monitoring  trulicity  Philo contour test  Metformin  microlet lancets    Past Medical History:    Abnormal Pap smear   Asthma   Diabetes mellitus (H)   Endometriosis   Gastro-oesophageal reflux disease  UTI     Past Surgical History:    Appendectomy  Bladder  Knee   Shoulder   Tonsillectomy  wrist    Family History:    No past pertinent family history.    Social History:  Presents alone.   Current every day smoker: 0.50 ppd for 20 years  Negative for alcohol use.  Marital Status:  Single [1]    Review of Systems   Constitutional: Positive for chills, diaphoresis and fever.   Gastrointestinal: Positive for abdominal pain, diarrhea and nausea. Negative for vomiting.   Genitourinary: Positive for flank pain. Negative for dysuria, hematuria, vaginal bleeding and vaginal discharge.   Skin: Negative for rash.   All other systems reviewed and are negative.    Physical Exam   First Vitals:  Patient Vitals for the past 24 hrs:   BP Temp Temp src Pulse Resp SpO2   05/10/18 1215 135/87 - - - - 93 %   05/10/18 1200 131/90 - - - - 94 %   05/10/18 1145 (!) 133/96 - - - - 94 %   05/10/18 1130 (!) 142/91 - -  - - 95 %   05/10/18 1124 (!) 135/95 - - 89 16 97 %   05/10/18 1030 126/83 - - - - 95 %   05/10/18 1015 127/83 - - - - 94 %   05/10/18 1000 126/80 - - - - 94 %   05/10/18 0945 131/90 - - - - 98 %   05/10/18 0930 (!) 138/91 - - - - 96 %   05/10/18 0915 (!) 154/101 - - - - 98 %   05/10/18 0911 (!) 148/107 97.5  F (36.4  C) Oral 102 20 99 %       Physical Exam  General: appears uncomfortable, holding hand to right lower back  Skin: Good turgor, no rash, no unusual bruising or prominent lesions.  HEENT: Head: Normocephalic, atraumatic, no visible or palpable masses, depressions, or scaring.  Eyes: Visual acuity intact, conjunctiva clear, sclera non-icteric   Cardiac: No cardiomegaly or thrills; regular rate and rhythm, no murmur or gallop.  Lungs: Clear to auscultation and percussion   Abdomen: Bowel sounds normal, no tenderness, organomegaly, masses, or hernia. No guarding or rebound tenderness. No upper right quadrant tenderness. Negative [place].   Musculoskeletal: Tenderness to palpation in right lower lumber. No right CVA tenderness. No tenderness to the sciatic region or left flank. No left CVA tenderness. Normal strength in all four extremities.    Neurologic: Sensation to pain, touch, and proprioception normal.   Psychiatric: Intact recent and remote memory, judgment and insight, normal mood and affect.     Emergency Department Course   Imaging:  Radiographic findings were communicated with the patient who voiced understanding of the findings.    CT Abdomen Pelvis with contrast:  IMPRESSION:  1. Hepatomegaly with diffuse fatty infiltration of the liver. No  urinary tract calculi or hydronephrosis.  2. Right ovarian enlargement compared to the contralateral left ovary.  Multiple cystic areas are present. Follow-up pelvic ultrasound for  further assessment if clinically warranted.  3. No bowel obstruction or diverticulitis. As per radiology.     Laboratory:  UA with micro: GLC 50, albumin 30, mucous present, o/w  negative  HCG: negative    CBC: WBC: 8.0, HGB: 14.2, PLT: 229  CMP: Glucose 175, creatinine 0.48 (L), calcium 8.2 (L), albumin 3.2 (L), o/w WNL     Lipase: 254    Interventions:  0954 Norco 2 tablets oral   Ibuprofen 600 mg oral   1137 Dilaudid 0.5 mg IV    Emergency Department Course:  Nursing notes and vitals reviewed. 0921 I performed an exam of the patient as documented above.     IV inserted. Medicine administered as documented above. Blood drawn. This was sent to the lab for further testing, results above.    1025 I rechecked the patient with Dr. Larson and discussed the results of her workup thus far. Pain had moderately improved. Normal UA was discussed. Further evaluation with CT abdomen and blood work was decided due to the patient's persistent pain.    1056 The patient was sent for CT abdomen while in the emergency department, findings above.     1156 I rechecked the patient and discussed the results of her workup thus far. Patient reports pain down to 1/10.     1221 Discussed the plan for discharge with the patient. She agreed to this plan.     Findings and plan explained to the Patient. Patient discharged home with instructions regarding supportive care, medications, and reasons to return. The importance of close follow-up was reviewed. The patient was prescribed percocet and flexeril for pain management. She has her significant other to drive her home.     I personally reviewed the laboratory results with the Patient and answered all related questions prior to discharge.     Impression & Plan      Medical Decision Making:  Winnie Ibarra is a 42 year old female who presents with right flank pain that began abruptly yesterday afternoon. Patient has prior history of UTIs and nephrolithiasis in the past, making both of these diagnoses possible. Differential also included muscular strain, pyelonephritis, or pathology from gallbaldder. Exam was consistent with right lower flank tenderness to  palpation. UA was clear, decreasing the likelihood of nephrolithiasis or pyelonephritis. Patient's pain decreased with analgesics but still remained.     In light of the negative UA and pregnancy, it was discussed with the patient to pursue further evaluation of her symptoms with CBC, CMP, lipase, and abdominal CT to evaluation for other pathology. Blood results were reassuring with no leukocytosis, normal liver enzymes and lipase. CT abdomen noted hepatomegaly and right ovarian cysts but no urinary tract calculi or hydronephrosis. On revaluation of the patient she states that she has a history of ovarian cysts but that her current pain is not similar to these events. Pain had also decreased to 1/10. It was discussed that her symptoms could be due to muscular strain through diagnosis of exclusion. Patient is in agreement and feels comfortable to discharge with pain medication management and muscle relaxants. Adverse effects of these medication of respiratory depression, drowsiness, and constipation were discussed with the patient.  Increased fluids, light stretching, and heat were recommended for use over lower back. She should return for evaluation if symptoms worsen or do not improve in the next 1-2 days. She denied need for antiemetics.       Diagnosis:    ICD-10-CM    1. Flank pain R10.9     Likely muscular strain       Disposition:  discharged to home    Discharge Medications:  New Prescriptions    CYCLOBENZAPRINE (FLEXERIL) 10 MG TABLET    Take 1 tablet (10 mg) by mouth 3 times daily as needed for muscle spasms    OXYCODONE-ACETAMINOPHEN (PERCOCET) 5-325 MG PER TABLET    Take 1-2 tablets by mouth every 4 hours as needed for pain     I, Yign Osman, am serving as a scribe on 5/10/2018 at 9:21 AM to personally document services performed by Lauryn Bowie based on my observations and the provider's statements to me.     Ying Osman  5/10/2018   [facility] EMERGENCY DEPARTMENT        Lauryn Bowie PA  05/10/18 1243

## 2018-05-25 NOTE — ED PROVIDER NOTES
"Emergency Department Attending Supervision Note  5/24/2018  10:56 PM    I evaluated this patient in conjunction with MANDO Bertrand    Briefly, the patient presented with right flank pain since yesterday afternoon. Has h/o UTI and kidney stones, but UA and CT negative. Other labs reassuring. No evidence for appendicitis on CT. No RUQ localization to suggest GB. Lipase nl. Exam was consistent with right lower flank tenderness to palpation. Patient's pain decreased with meds.    CT abdomen/pelvis hepatomegaly and right ovarian cysts. Pt has has a history of ovarian cysts but that her current pain is not similar to these events. No true \"pelvic\" pain at this time. Doubt that her cysts are symptomatic.    her symptoms could be due to muscular strain through diagnosis of exclusion.    Pt feels better, ready go go home. Careful watchful waiting with return precautions.     Diagnosis:      ICD-10-CM     1. Flank pain R10.9       possibly muscular strain                   Dino Larson MD  05/24/18 8955    "

## 2019-08-02 RX ORDER — GLIMEPIRIDE 2 MG/1
2 TABLET ORAL
COMMUNITY

## 2019-08-02 RX ORDER — METFORMIN HCL 500 MG
1500 TABLET, EXTENDED RELEASE 24 HR ORAL DAILY
Status: ON HOLD | COMMUNITY
End: 2019-08-09

## 2019-08-02 RX ORDER — LOSARTAN POTASSIUM 25 MG/1
25 TABLET ORAL DAILY
COMMUNITY

## 2019-08-02 ASSESSMENT — MIFFLIN-ST. JEOR: SCORE: 1969.5

## 2019-08-08 ENCOUNTER — HOSPITAL ENCOUNTER (OUTPATIENT)
Dept: LAB | Facility: CLINIC | Age: 44
Discharge: HOME | End: 2019-08-08
Attending: OBSTETRICS & GYNECOLOGY | Admitting: OBSTETRICS & GYNECOLOGY
Payer: COMMERCIAL

## 2019-08-08 DIAGNOSIS — Z01.812 PRE-OPERATIVE LABORATORY EXAMINATION: ICD-10-CM

## 2019-08-08 LAB
ABO + RH BLD: NORMAL
ABO + RH BLD: NORMAL
BLD GP AB SCN SERPL QL: NORMAL
BLOOD BANK CMNT PATIENT-IMP: NORMAL
SPECIMEN EXP DATE BLD: NORMAL

## 2019-08-08 PROCEDURE — 36415 COLL VENOUS BLD VENIPUNCTURE: CPT | Performed by: OBSTETRICS & GYNECOLOGY

## 2019-08-08 PROCEDURE — 86900 BLOOD TYPING SEROLOGIC ABO: CPT | Performed by: OBSTETRICS & GYNECOLOGY

## 2019-08-08 PROCEDURE — 86850 RBC ANTIBODY SCREEN: CPT | Performed by: OBSTETRICS & GYNECOLOGY

## 2019-08-08 PROCEDURE — 86901 BLOOD TYPING SEROLOGIC RH(D): CPT | Performed by: OBSTETRICS & GYNECOLOGY

## 2019-08-09 ENCOUNTER — ANESTHESIA (OUTPATIENT)
Dept: SURGERY | Facility: CLINIC | Age: 44
End: 2019-08-09
Payer: COMMERCIAL

## 2019-08-09 ENCOUNTER — ANESTHESIA EVENT (OUTPATIENT)
Dept: SURGERY | Facility: CLINIC | Age: 44
End: 2019-08-09
Payer: COMMERCIAL

## 2019-08-09 ENCOUNTER — HOSPITAL ENCOUNTER (OUTPATIENT)
Facility: CLINIC | Age: 44
Discharge: HOME | End: 2019-08-10
Attending: OBSTETRICS & GYNECOLOGY | Admitting: OBSTETRICS & GYNECOLOGY
Payer: COMMERCIAL

## 2019-08-09 DIAGNOSIS — Z90.710 S/P LAPAROSCOPIC HYSTERECTOMY: Primary | ICD-10-CM

## 2019-08-09 LAB
GLUCOSE BLDC GLUCOMTR-MCNC: 153 MG/DL (ref 70–99)
GLUCOSE BLDC GLUCOMTR-MCNC: 95 MG/DL (ref 70–99)
GLUCOSE SERPL-MCNC: 165 MG/DL (ref 70–99)
HCG UR QL: NEGATIVE
HGB BLD-MCNC: 13.5 G/DL (ref 11.7–15.7)
INTERPRETATION ECG - MUSE: NORMAL

## 2019-08-09 PROCEDURE — 40000306 ZZH STATISTIC PRE PROC ASSESS II: Performed by: OBSTETRICS & GYNECOLOGY

## 2019-08-09 PROCEDURE — 71000013 ZZH RECOVERY PHASE 1 LEVEL 1 EA ADDTL HR: Performed by: OBSTETRICS & GYNECOLOGY

## 2019-08-09 PROCEDURE — 25000128 H RX IP 250 OP 636: Performed by: OBSTETRICS & GYNECOLOGY

## 2019-08-09 PROCEDURE — 36000063 ZZH SURGERY LEVEL 4 EA 15 ADDTL MIN: Performed by: OBSTETRICS & GYNECOLOGY

## 2019-08-09 PROCEDURE — 37000009 ZZH ANESTHESIA TECHNICAL FEE, EACH ADDTL 15 MIN: Performed by: OBSTETRICS & GYNECOLOGY

## 2019-08-09 PROCEDURE — 93010 ELECTROCARDIOGRAM REPORT: CPT | Performed by: INTERNAL MEDICINE

## 2019-08-09 PROCEDURE — 81025 URINE PREGNANCY TEST: CPT | Performed by: ANESTHESIOLOGY

## 2019-08-09 PROCEDURE — 25800025 ZZH RX 258: Performed by: OBSTETRICS & GYNECOLOGY

## 2019-08-09 PROCEDURE — 25800030 ZZH RX IP 258 OP 636: Performed by: ANESTHESIOLOGY

## 2019-08-09 PROCEDURE — 27210794 ZZH OR GENERAL SUPPLY STERILE: Performed by: OBSTETRICS & GYNECOLOGY

## 2019-08-09 PROCEDURE — 84702 CHORIONIC GONADOTROPIN TEST: CPT | Performed by: OBSTETRICS & GYNECOLOGY

## 2019-08-09 PROCEDURE — 36415 COLL VENOUS BLD VENIPUNCTURE: CPT | Performed by: OBSTETRICS & GYNECOLOGY

## 2019-08-09 PROCEDURE — 82962 GLUCOSE BLOOD TEST: CPT | Mod: 91

## 2019-08-09 PROCEDURE — 88307 TISSUE EXAM BY PATHOLOGIST: CPT | Mod: 26 | Performed by: OBSTETRICS & GYNECOLOGY

## 2019-08-09 PROCEDURE — 71000012 ZZH RECOVERY PHASE 1 LEVEL 1 FIRST HR: Performed by: OBSTETRICS & GYNECOLOGY

## 2019-08-09 PROCEDURE — 25000125 ZZHC RX 250: Performed by: OBSTETRICS & GYNECOLOGY

## 2019-08-09 PROCEDURE — 37000008 ZZH ANESTHESIA TECHNICAL FEE, 1ST 30 MIN: Performed by: OBSTETRICS & GYNECOLOGY

## 2019-08-09 PROCEDURE — 25000128 H RX IP 250 OP 636: Performed by: NURSE ANESTHETIST, CERTIFIED REGISTERED

## 2019-08-09 PROCEDURE — 88307 TISSUE EXAM BY PATHOLOGIST: CPT | Performed by: OBSTETRICS & GYNECOLOGY

## 2019-08-09 PROCEDURE — 25000132 ZZH RX MED GY IP 250 OP 250 PS 637: Performed by: OBSTETRICS & GYNECOLOGY

## 2019-08-09 PROCEDURE — 85018 HEMOGLOBIN: CPT | Performed by: ANESTHESIOLOGY

## 2019-08-09 PROCEDURE — 82947 ASSAY GLUCOSE BLOOD QUANT: CPT | Mod: 91 | Performed by: OBSTETRICS & GYNECOLOGY

## 2019-08-09 PROCEDURE — 36000093 ZZH SURGERY LEVEL 4 1ST 30 MIN: Performed by: OBSTETRICS & GYNECOLOGY

## 2019-08-09 PROCEDURE — 25000125 ZZHC RX 250: Performed by: NURSE ANESTHETIST, CERTIFIED REGISTERED

## 2019-08-09 RX ORDER — CEFAZOLIN SODIUM 1 G/3ML
1 INJECTION, POWDER, FOR SOLUTION INTRAMUSCULAR; INTRAVENOUS SEE ADMIN INSTRUCTIONS
Status: DISCONTINUED | OUTPATIENT
Start: 2019-08-09 | End: 2019-08-09 | Stop reason: HOSPADM

## 2019-08-09 RX ORDER — LIDOCAINE HYDROCHLORIDE 10 MG/ML
INJECTION, SOLUTION INFILTRATION; PERINEURAL PRN
Status: DISCONTINUED | OUTPATIENT
Start: 2019-08-09 | End: 2019-08-09

## 2019-08-09 RX ORDER — PROPOFOL 10 MG/ML
INJECTION, EMULSION INTRAVENOUS PRN
Status: DISCONTINUED | OUTPATIENT
Start: 2019-08-09 | End: 2019-08-09

## 2019-08-09 RX ORDER — KETOROLAC TROMETHAMINE 30 MG/ML
30 INJECTION, SOLUTION INTRAMUSCULAR; INTRAVENOUS EVERY 6 HOURS
Status: DISCONTINUED | OUTPATIENT
Start: 2019-08-09 | End: 2019-08-10 | Stop reason: HOSPADM

## 2019-08-09 RX ORDER — LIDOCAINE 40 MG/G
CREAM TOPICAL
Status: DISCONTINUED | OUTPATIENT
Start: 2019-08-09 | End: 2019-08-09 | Stop reason: HOSPADM

## 2019-08-09 RX ORDER — DEXTROSE MONOHYDRATE 25 G/50ML
25-50 INJECTION, SOLUTION INTRAVENOUS
Status: DISCONTINUED | OUTPATIENT
Start: 2019-08-09 | End: 2019-08-10 | Stop reason: HOSPADM

## 2019-08-09 RX ORDER — OXYCODONE HYDROCHLORIDE 5 MG/1
5-10 TABLET ORAL
Status: DISCONTINUED | OUTPATIENT
Start: 2019-08-09 | End: 2019-08-10 | Stop reason: HOSPADM

## 2019-08-09 RX ORDER — GLIMEPIRIDE 1 MG/1
2 TABLET ORAL DAILY
Status: DISCONTINUED | OUTPATIENT
Start: 2019-08-09 | End: 2019-08-10 | Stop reason: HOSPADM

## 2019-08-09 RX ORDER — FENTANYL CITRATE 50 UG/ML
25-50 INJECTION, SOLUTION INTRAMUSCULAR; INTRAVENOUS EVERY 5 MIN PRN
Status: DISCONTINUED | OUTPATIENT
Start: 2019-08-09 | End: 2019-08-09 | Stop reason: HOSPADM

## 2019-08-09 RX ORDER — ONDANSETRON 4 MG/1
4 TABLET, ORALLY DISINTEGRATING ORAL EVERY 30 MIN PRN
Status: DISCONTINUED | OUTPATIENT
Start: 2019-08-09 | End: 2019-08-09 | Stop reason: HOSPADM

## 2019-08-09 RX ORDER — NALOXONE HYDROCHLORIDE 0.4 MG/ML
.1-.4 INJECTION, SOLUTION INTRAMUSCULAR; INTRAVENOUS; SUBCUTANEOUS
Status: DISCONTINUED | OUTPATIENT
Start: 2019-08-09 | End: 2019-08-09 | Stop reason: HOSPADM

## 2019-08-09 RX ORDER — ONDANSETRON 2 MG/ML
4 INJECTION INTRAMUSCULAR; INTRAVENOUS EVERY 30 MIN PRN
Status: DISCONTINUED | OUTPATIENT
Start: 2019-08-09 | End: 2019-08-09 | Stop reason: HOSPADM

## 2019-08-09 RX ORDER — PROPOFOL 10 MG/ML
INJECTION, EMULSION INTRAVENOUS CONTINUOUS PRN
Status: DISCONTINUED | OUTPATIENT
Start: 2019-08-09 | End: 2019-08-09

## 2019-08-09 RX ORDER — DEXAMETHASONE SODIUM PHOSPHATE 4 MG/ML
INJECTION, SOLUTION INTRA-ARTICULAR; INTRALESIONAL; INTRAMUSCULAR; INTRAVENOUS; SOFT TISSUE PRN
Status: DISCONTINUED | OUTPATIENT
Start: 2019-08-09 | End: 2019-08-09

## 2019-08-09 RX ORDER — IBUPROFEN 600 MG/1
600 TABLET, FILM COATED ORAL EVERY 6 HOURS PRN
Qty: 30 TABLET | Refills: 0 | Status: SHIPPED | OUTPATIENT
Start: 2019-08-09

## 2019-08-09 RX ORDER — HYDROMORPHONE HYDROCHLORIDE 1 MG/ML
0.2 INJECTION, SOLUTION INTRAMUSCULAR; INTRAVENOUS; SUBCUTANEOUS
Status: DISCONTINUED | OUTPATIENT
Start: 2019-08-09 | End: 2019-08-10 | Stop reason: HOSPADM

## 2019-08-09 RX ORDER — OXYCODONE AND ACETAMINOPHEN 5; 325 MG/1; MG/1
1-2 TABLET ORAL EVERY 4 HOURS PRN
Qty: 12 TABLET | Refills: 0 | Status: SHIPPED | OUTPATIENT
Start: 2019-08-09

## 2019-08-09 RX ORDER — KETOROLAC TROMETHAMINE 30 MG/ML
30 INJECTION, SOLUTION INTRAMUSCULAR; INTRAVENOUS EVERY 6 HOURS
Status: DISCONTINUED | OUTPATIENT
Start: 2019-08-09 | End: 2019-08-09

## 2019-08-09 RX ORDER — FENTANYL CITRATE 50 UG/ML
25-50 INJECTION, SOLUTION INTRAMUSCULAR; INTRAVENOUS
Status: DISCONTINUED | OUTPATIENT
Start: 2019-08-09 | End: 2019-08-09 | Stop reason: HOSPADM

## 2019-08-09 RX ORDER — MINERAL OIL
OIL (ML) MISCELLANEOUS PRN
Status: DISCONTINUED | OUTPATIENT
Start: 2019-08-09 | End: 2019-08-09 | Stop reason: HOSPADM

## 2019-08-09 RX ORDER — SODIUM CHLORIDE, SODIUM LACTATE, POTASSIUM CHLORIDE, CALCIUM CHLORIDE 600; 310; 30; 20 MG/100ML; MG/100ML; MG/100ML; MG/100ML
INJECTION, SOLUTION INTRAVENOUS CONTINUOUS
Status: DISCONTINUED | OUTPATIENT
Start: 2019-08-09 | End: 2019-08-09 | Stop reason: HOSPADM

## 2019-08-09 RX ORDER — ACETAMINOPHEN 325 MG/1
650 TABLET ORAL EVERY 6 HOURS PRN
Status: DISCONTINUED | OUTPATIENT
Start: 2019-08-09 | End: 2019-08-10 | Stop reason: HOSPADM

## 2019-08-09 RX ORDER — LOSARTAN POTASSIUM 25 MG/1
25 TABLET ORAL DAILY
Status: DISCONTINUED | OUTPATIENT
Start: 2019-08-09 | End: 2019-08-10 | Stop reason: HOSPADM

## 2019-08-09 RX ORDER — IBUPROFEN 600 MG/1
600 TABLET, FILM COATED ORAL EVERY 6 HOURS PRN
Status: DISCONTINUED | OUTPATIENT
Start: 2019-08-10 | End: 2019-08-10 | Stop reason: HOSPADM

## 2019-08-09 RX ORDER — HYDROMORPHONE HYDROCHLORIDE 1 MG/ML
.3-.5 INJECTION, SOLUTION INTRAMUSCULAR; INTRAVENOUS; SUBCUTANEOUS EVERY 10 MIN PRN
Status: DISCONTINUED | OUTPATIENT
Start: 2019-08-09 | End: 2019-08-09 | Stop reason: HOSPADM

## 2019-08-09 RX ORDER — METFORMIN HYDROCHLORIDE 750 MG/1
1500 TABLET, EXTENDED RELEASE ORAL DAILY
Status: DISCONTINUED | OUTPATIENT
Start: 2019-08-09 | End: 2019-08-10 | Stop reason: HOSPADM

## 2019-08-09 RX ORDER — BUPIVACAINE HYDROCHLORIDE 5 MG/ML
INJECTION, SOLUTION EPIDURAL; INTRACAUDAL PRN
Status: DISCONTINUED | OUTPATIENT
Start: 2019-08-09 | End: 2019-08-09 | Stop reason: HOSPADM

## 2019-08-09 RX ORDER — KETOROLAC TROMETHAMINE 30 MG/ML
INJECTION, SOLUTION INTRAMUSCULAR; INTRAVENOUS PRN
Status: DISCONTINUED | OUTPATIENT
Start: 2019-08-09 | End: 2019-08-09

## 2019-08-09 RX ORDER — CEFAZOLIN SODIUM IN 0.9 % NACL 3 G/100 ML
3 INTRAVENOUS SOLUTION, PIGGYBACK (ML) INTRAVENOUS
Status: COMPLETED | OUTPATIENT
Start: 2019-08-09 | End: 2019-08-09

## 2019-08-09 RX ORDER — LIDOCAINE 40 MG/G
CREAM TOPICAL
Status: DISCONTINUED | OUTPATIENT
Start: 2019-08-09 | End: 2019-08-10 | Stop reason: HOSPADM

## 2019-08-09 RX ORDER — METFORMIN HCL 500 MG
2000 TABLET, EXTENDED RELEASE 24 HR ORAL AT BEDTIME
COMMUNITY

## 2019-08-09 RX ORDER — ONDANSETRON 2 MG/ML
INJECTION INTRAMUSCULAR; INTRAVENOUS PRN
Status: DISCONTINUED | OUTPATIENT
Start: 2019-08-09 | End: 2019-08-09

## 2019-08-09 RX ORDER — ONDANSETRON 4 MG/1
4 TABLET, ORALLY DISINTEGRATING ORAL EVERY 6 HOURS PRN
Status: DISCONTINUED | OUTPATIENT
Start: 2019-08-09 | End: 2019-08-10 | Stop reason: HOSPADM

## 2019-08-09 RX ORDER — ONDANSETRON 2 MG/ML
4 INJECTION INTRAMUSCULAR; INTRAVENOUS EVERY 6 HOURS PRN
Status: DISCONTINUED | OUTPATIENT
Start: 2019-08-09 | End: 2019-08-10 | Stop reason: HOSPADM

## 2019-08-09 RX ORDER — NALOXONE HYDROCHLORIDE 0.4 MG/ML
.1-.4 INJECTION, SOLUTION INTRAMUSCULAR; INTRAVENOUS; SUBCUTANEOUS
Status: DISCONTINUED | OUTPATIENT
Start: 2019-08-09 | End: 2019-08-10 | Stop reason: HOSPADM

## 2019-08-09 RX ORDER — SODIUM CHLORIDE, SODIUM LACTATE, POTASSIUM CHLORIDE, CALCIUM CHLORIDE 600; 310; 30; 20 MG/100ML; MG/100ML; MG/100ML; MG/100ML
INJECTION, SOLUTION INTRAVENOUS CONTINUOUS
Status: DISCONTINUED | OUTPATIENT
Start: 2019-08-09 | End: 2019-08-10 | Stop reason: HOSPADM

## 2019-08-09 RX ORDER — FENTANYL CITRATE 50 UG/ML
INJECTION, SOLUTION INTRAMUSCULAR; INTRAVENOUS PRN
Status: DISCONTINUED | OUTPATIENT
Start: 2019-08-09 | End: 2019-08-09

## 2019-08-09 RX ORDER — METOCLOPRAMIDE HYDROCHLORIDE 5 MG/ML
10 INJECTION INTRAMUSCULAR; INTRAVENOUS EVERY 6 HOURS PRN
Status: DISCONTINUED | OUTPATIENT
Start: 2019-08-09 | End: 2019-08-10 | Stop reason: HOSPADM

## 2019-08-09 RX ORDER — MEPERIDINE HYDROCHLORIDE 50 MG/ML
12.5 INJECTION INTRAMUSCULAR; INTRAVENOUS; SUBCUTANEOUS
Status: DISCONTINUED | OUTPATIENT
Start: 2019-08-09 | End: 2019-08-09 | Stop reason: HOSPADM

## 2019-08-09 RX ORDER — GLYCOPYRROLATE 0.2 MG/ML
INJECTION, SOLUTION INTRAMUSCULAR; INTRAVENOUS PRN
Status: DISCONTINUED | OUTPATIENT
Start: 2019-08-09 | End: 2019-08-09

## 2019-08-09 RX ORDER — NICOTINE POLACRILEX 4 MG
15-30 LOZENGE BUCCAL
Status: DISCONTINUED | OUTPATIENT
Start: 2019-08-09 | End: 2019-08-10 | Stop reason: HOSPADM

## 2019-08-09 RX ORDER — ACETAMINOPHEN 325 MG/1
650 TABLET ORAL EVERY 4 HOURS PRN
Qty: 100 TABLET | Refills: 0 | Status: SHIPPED | OUTPATIENT
Start: 2019-08-09

## 2019-08-09 RX ORDER — NEOSTIGMINE METHYLSULFATE 1 MG/ML
VIAL (ML) INJECTION PRN
Status: DISCONTINUED | OUTPATIENT
Start: 2019-08-09 | End: 2019-08-09

## 2019-08-09 RX ADMIN — KETOROLAC TROMETHAMINE 30 MG: 30 INJECTION, SOLUTION INTRAMUSCULAR at 11:32

## 2019-08-09 RX ADMIN — HYDROMORPHONE HYDROCHLORIDE 0.5 MG: 1 INJECTION, SOLUTION INTRAMUSCULAR; INTRAVENOUS; SUBCUTANEOUS at 11:15

## 2019-08-09 RX ADMIN — ROCURONIUM BROMIDE 10 MG: 10 INJECTION INTRAVENOUS at 08:15

## 2019-08-09 RX ADMIN — OXYCODONE HYDROCHLORIDE 5 MG: 5 TABLET ORAL at 23:24

## 2019-08-09 RX ADMIN — Medication 3 G: at 07:40

## 2019-08-09 RX ADMIN — ROCURONIUM BROMIDE 10 MG: 10 INJECTION INTRAVENOUS at 11:13

## 2019-08-09 RX ADMIN — GLYCOPYRROLATE 0.5 MG: 0.2 INJECTION, SOLUTION INTRAMUSCULAR; INTRAVENOUS at 11:31

## 2019-08-09 RX ADMIN — PROPOFOL 75 MCG/KG/MIN: 10 INJECTION, EMULSION INTRAVENOUS at 07:50

## 2019-08-09 RX ADMIN — Medication 1 G: at 09:40

## 2019-08-09 RX ADMIN — MIDAZOLAM 2 MG: 1 INJECTION INTRAMUSCULAR; INTRAVENOUS at 07:40

## 2019-08-09 RX ADMIN — PROPOFOL 200 MG: 10 INJECTION, EMULSION INTRAVENOUS at 07:50

## 2019-08-09 RX ADMIN — RANITIDINE 150 MG: 150 TABLET ORAL at 20:37

## 2019-08-09 RX ADMIN — KETOROLAC TROMETHAMINE 30 MG: 30 INJECTION, SOLUTION INTRAMUSCULAR at 23:02

## 2019-08-09 RX ADMIN — ACETAMINOPHEN 650 MG: 325 TABLET, FILM COATED ORAL at 15:00

## 2019-08-09 RX ADMIN — ONDANSETRON 4 MG: 2 INJECTION INTRAMUSCULAR; INTRAVENOUS at 11:25

## 2019-08-09 RX ADMIN — OXYCODONE HYDROCHLORIDE 5 MG: 5 TABLET ORAL at 16:08

## 2019-08-09 RX ADMIN — DEXAMETHASONE SODIUM PHOSPHATE 4 MG: 4 INJECTION, SOLUTION INTRA-ARTICULAR; INTRALESIONAL; INTRAMUSCULAR; INTRAVENOUS; SOFT TISSUE at 07:50

## 2019-08-09 RX ADMIN — Medication 5 MG: at 11:31

## 2019-08-09 RX ADMIN — FENTANYL CITRATE 50 MCG: 50 INJECTION, SOLUTION INTRAMUSCULAR; INTRAVENOUS at 09:48

## 2019-08-09 RX ADMIN — LIDOCAINE HYDROCHLORIDE 50 MG: 10 INJECTION, SOLUTION INFILTRATION; PERINEURAL at 07:50

## 2019-08-09 RX ADMIN — ROCURONIUM BROMIDE 5 MG: 10 INJECTION INTRAVENOUS at 09:58

## 2019-08-09 RX ADMIN — OXYCODONE HYDROCHLORIDE 5 MG: 5 TABLET ORAL at 19:03

## 2019-08-09 RX ADMIN — FENTANYL CITRATE 100 MCG: 50 INJECTION, SOLUTION INTRAMUSCULAR; INTRAVENOUS at 07:50

## 2019-08-09 RX ADMIN — ROCURONIUM BROMIDE 40 MG: 10 INJECTION INTRAVENOUS at 07:50

## 2019-08-09 RX ADMIN — HYDROMORPHONE HYDROCHLORIDE 0.5 MG: 1 INJECTION, SOLUTION INTRAMUSCULAR; INTRAVENOUS; SUBCUTANEOUS at 11:02

## 2019-08-09 RX ADMIN — Medication 1 LOZENGE: at 20:55

## 2019-08-09 RX ADMIN — HYDROMORPHONE HYDROCHLORIDE 1 MG: 1 INJECTION, SOLUTION INTRAMUSCULAR; INTRAVENOUS; SUBCUTANEOUS at 07:54

## 2019-08-09 RX ADMIN — SODIUM CHLORIDE, POTASSIUM CHLORIDE, SODIUM LACTATE AND CALCIUM CHLORIDE: 600; 310; 30; 20 INJECTION, SOLUTION INTRAVENOUS at 12:04

## 2019-08-09 RX ADMIN — METFORMIN HYDROCHLORIDE 1500 MG: 750 TABLET, EXTENDED RELEASE ORAL at 20:37

## 2019-08-09 RX ADMIN — LOSARTAN POTASSIUM 25 MG: 25 TABLET, FILM COATED ORAL at 20:36

## 2019-08-09 RX ADMIN — FENTANYL CITRATE 50 MCG: 50 INJECTION, SOLUTION INTRAMUSCULAR; INTRAVENOUS at 08:30

## 2019-08-09 RX ADMIN — KETOROLAC TROMETHAMINE 30 MG: 30 INJECTION, SOLUTION INTRAMUSCULAR at 16:03

## 2019-08-09 RX ADMIN — GLIMEPIRIDE 2 MG: 1 TABLET ORAL at 20:37

## 2019-08-09 RX ADMIN — SODIUM CHLORIDE, POTASSIUM CHLORIDE, SODIUM LACTATE AND CALCIUM CHLORIDE: 600; 310; 30; 20 INJECTION, SOLUTION INTRAVENOUS at 09:17

## 2019-08-09 RX ADMIN — GLYCOPYRROLATE 0.2 MG: 0.2 INJECTION, SOLUTION INTRAMUSCULAR; INTRAVENOUS at 07:50

## 2019-08-09 RX ADMIN — SODIUM CHLORIDE, POTASSIUM CHLORIDE, SODIUM LACTATE AND CALCIUM CHLORIDE: 600; 310; 30; 20 INJECTION, SOLUTION INTRAVENOUS at 07:40

## 2019-08-09 RX ADMIN — ROCURONIUM BROMIDE 10 MG: 10 INJECTION INTRAVENOUS at 08:02

## 2019-08-09 RX ADMIN — ROCURONIUM BROMIDE 15 MG: 10 INJECTION INTRAVENOUS at 10:31

## 2019-08-09 ASSESSMENT — MIFFLIN-ST. JEOR: SCORE: 1964.96

## 2019-08-09 NOTE — ANESTHESIA POSTPROCEDURE EVALUATION
Patient: Winnie Ibarra    Procedure(s):  Single Site Total Laparoscopic Hysterectomy, Bilateral Salpingectomy    Diagnosis:menorrhagia, dysmenorrhea  Diagnosis Additional Information: No value filed.    Anesthesia Type:  General, ETT    Note:  Anesthesia Post Evaluation    Patient location during evaluation: PACU  Patient participation: Able to fully participate in evaluation  Level of consciousness: awake and alert  Pain management: adequate  Airway patency: patent  Cardiovascular status: acceptable  Respiratory status: acceptable  Hydration status: acceptable  PONV: controlled             Last vitals:  Vitals:    08/09/19 0641 08/09/19 1149 08/09/19 1150   BP: 116/69 (!) 89/64    Pulse:  92    Resp: 20 20 20   Temp: 97.6  F (36.4  C) 97.5  F (36.4  C)    SpO2: 96% 94% 94%         Electronically Signed By: Wade Rojas MD  August 9, 2019  11:55 AM   Hyperbilirubinemia guideline

## 2019-08-09 NOTE — PLAN OF CARE
"PRIMARY DIAGNOSIS: POST-OP (Total Laparoscopic Hysterectomy, Bilateral Salpingectomy)  OUTPATIENT/OBSERVATION GOALS TO BE MET BEFORE DISCHARGE:  1. Stable vital signs Yes  2. Tolerating diet:Yes  3. Pain controlled with oral pain medications:  Yes  4. Positive bowel sounds:  Yes  5. Voiding without difficulty:  No  6. Able to ambulate:  Yes  7. Provider specific discharge goals met:  No    Discharge Planner Nurse   Safe discharge environment identified: Yes  Barriers to discharge: No       Entered by: Chante Aguero 08/09/2019 4:00 PM   /85 (BP Location: Left arm)   Pulse 69   Temp 97.8  F (36.6  C) (Oral)   Resp 18   Ht 1.778 m (5' 10\")   Wt 122.5 kg (270 lb)   LMP 07/12/2019   SpO2 94%   BMI 38.74 kg/m    A&Ox4. RA. Patient declined use of capnography, continuous pulse-oximeter in place. Tachycardic (HR 80-120s), VSS otherwise. C/o numbness/tingling to RUE, CMS otherwise intact. Up w/ Ax1, gait belt and walker. Ambulated in hallway, tolerated well. C/ of left sided \"ureteral\" pain, relieved w/ scheduled IV Toradol and po Oxycodone prn. Incisional site WDL. Ice applied for comfort. Hawley catheter patent w/ adequate amt's of output. BS active x4, tolerating regular diet, denies passing flatus. Denies nausea. SL. Will continue to monitor.       Please review provider order for any additional goals.   Nurse to notify provider when observation goals have been met and patient is ready for discharge.  "
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
## 2019-08-09 NOTE — PHARMACY-ADMISSION MEDICATION HISTORY
Admission medication history interview status for this patient is complete. See [facility] admission navigator for allergy information, prior to admission medications and immunization status.     Medication history interview source(s):Patient  Medication history resources (including written lists, pill bottles, clinic record): pre-admitting nurse      Changes made to PTA medication list:  Added: none  Deleted: none  Changed: metformin 1500mg --> 2000mg    Actions taken by pharmacist (provider contacted, etc):None     Additional medication history information:None    Medication reconciliation/reorder completed by provider prior to medication history? Yes    Do you take OTC medications (eg tylenol, ibuprofen, fish oil, eye/ear drops, etc)? N(Y/N)    For patients on insulin therapy: N (Y/N)      Prior to Admission medications    Medication Sig Last Dose Taking? Auth Provider   acetaminophen (TYLENOL) 325 MG tablet Take 2 tablets (650 mg) by mouth every 4 hours as needed for other (mild pain)  Yes Ny Jamison MD   Dulaglutide (TRULICITY SC) Inject 1.5 mg Subcutaneous once a week  8/2/2019 Yes Reported, Patient   glimepiride (AMARYL) 2 MG tablet Take 2 mg by mouth daily (with dinner)  8/8/2019 at Unknown time Yes Reported, Patient   ibuprofen (ADVIL/MOTRIN) 600 MG tablet Take 1 tablet (600 mg) by mouth every 6 hours as needed for pain (mild)  Yes Ny Jamison MD   losartan (COZAAR) 25 MG tablet Take 25 mg by mouth daily 8/8/2019 at Unknown time Yes Reported, Patient   metFORMIN (GLUCOPHAGE-XR) 500 MG 24 hr tablet Take 2,000 mg by mouth At Bedtime 8/8/2019 at Unknown time Yes Unknown, Entered By History   oxyCODONE-acetaminophen (PERCOCET) 5-325 MG tablet Take 1-2 tablets by mouth every 4 hours as needed for pain (moderate to severe)  Yes Ny Jamison MD   Blood Glucose Monitoring Suppl (CONTOUR BLOOD GLUCOSE SYSTEM) JUSTINO  Unknown at Unknown time  Reported, Patient   Glucose Blood (LUKAS CONTOUR TEST VI) by In Vitro  route. Unknown at Unknown time  Reported, Patient   ibuprofen (ADVIL,MOTRIN) 400-800 mg tablet Take 1-2 tablets by mouth every 6 hours as needed (cramping). Unknown at Unknown time  Priscilla Rodriguez MD   MICROLET LANCETS MISC  Unknown at Unknown time  Reported, Patient

## 2019-08-09 NOTE — BRIEF OP NOTE
[facility]    Brief Operative Note    Pre-operative diagnosis: menorrhagia, dysmenorrhea  Post-operative diagnosis Same as above  Procedure: Procedure(s):  Single Site Total Laparoscopic Hysterectomy, Bilateral Salpingectomy  Surgeon: Surgeon(s) and Role:     * Ny Jamison MD - Primary     * Joe Willard MD - Assisting  Anesthesia: General   Estimated blood loss: 50cc  Specimens:   ID Type Source Tests Collected by Time Destination   A : UTERUS, CERVIX, BILATERAL FALLOPIAN TUBES Tissue Uterus, Cervix and Bilateral Fallopian Tubes SURGICAL PATHOLOGY EXAM Ny Jamison MD 8/9/2019  9:46 AM      Findings:   Mildly enlarge uterus, normal ovaries/tubes.  Complications  none.

## 2019-08-09 NOTE — ANESTHESIA CARE TRANSFER NOTE
Patient: Winnie Ibarra    Procedure(s):  Single Site Total Laparoscopic Hysterectomy, Bilateral Salpingectomy    Diagnosis: menorrhagia, dysmenorrhea  Diagnosis Additional Information: No value filed.    Anesthesia Type:   General, ETT     Note:  Airway :Face Mask and Blow-by  Patient transferred to:PACU  Comments: VSS, patent airway, report to RNHandoff Report: Identifed the Patient, Identified the Reponsible Provider, Reviewed the pertinent medical history, Discussed the surgical course, Reviewed Intra-OP anesthesia mangement and issues during anesthesia and Allowed opportunity for questions and acknowledgement of understanding      Vitals: (Last set prior to Anesthesia Care Transfer)    CRNA VITALS  8/9/2019 1118 - 8/9/2019 1204      8/9/2019             Pulse:  112    SpO2:  94 %                Electronically Signed By: ANKUR Harrell CRNA  August 9, 2019  1148 PM

## 2019-08-10 VITALS
OXYGEN SATURATION: 97 % | RESPIRATION RATE: 17 BRPM | BODY MASS INDEX: 38.65 KG/M2 | SYSTOLIC BLOOD PRESSURE: 131 MMHG | HEART RATE: 98 BPM | DIASTOLIC BLOOD PRESSURE: 76 MMHG | TEMPERATURE: 99.4 F | HEIGHT: 70 IN | WEIGHT: 270 LBS

## 2019-08-10 LAB
GLUCOSE SERPL-MCNC: 100 MG/DL (ref 70–99)
HGB BLD-MCNC: 11.9 G/DL (ref 11.7–15.7)

## 2019-08-10 PROCEDURE — 85018 HEMOGLOBIN: CPT | Performed by: OBSTETRICS & GYNECOLOGY

## 2019-08-10 PROCEDURE — 82947 ASSAY GLUCOSE BLOOD QUANT: CPT | Performed by: OBSTETRICS & GYNECOLOGY

## 2019-08-10 PROCEDURE — 25000132 ZZH RX MED GY IP 250 OP 250 PS 637: Performed by: OBSTETRICS & GYNECOLOGY

## 2019-08-10 PROCEDURE — 25000128 H RX IP 250 OP 636: Performed by: OBSTETRICS & GYNECOLOGY

## 2019-08-10 PROCEDURE — 36415 COLL VENOUS BLD VENIPUNCTURE: CPT | Performed by: OBSTETRICS & GYNECOLOGY

## 2019-08-10 RX ORDER — OXYCODONE HYDROCHLORIDE 5 MG/1
5-10 TABLET ORAL
Qty: 30 TABLET | Refills: 0 | Status: SHIPPED | OUTPATIENT
Start: 2019-08-10

## 2019-08-10 RX ADMIN — KETOROLAC TROMETHAMINE 30 MG: 30 INJECTION, SOLUTION INTRAMUSCULAR at 05:22

## 2019-08-10 RX ADMIN — OXYCODONE HYDROCHLORIDE 5 MG: 5 TABLET ORAL at 11:42

## 2019-08-10 RX ADMIN — RANITIDINE 150 MG: 150 TABLET ORAL at 08:18

## 2019-08-10 RX ADMIN — OXYCODONE HYDROCHLORIDE 5 MG: 5 TABLET ORAL at 08:24

## 2019-08-10 RX ADMIN — OXYCODONE HYDROCHLORIDE 5 MG: 5 TABLET ORAL at 04:05

## 2019-08-10 NOTE — PLAN OF CARE
Patient's After Visit Summary was reviewed with patient.   Patient verbalized understanding of After Visit Summary, recommended follow up and was given an opportunity to ask questions.   Discharge medications sent home with patient/family: No   Discharged with spouse    OBSERVATION patient END time: 1145    VSS. Declined wheelchair ride.  Ambulated out. Hard script for Oxycodone given to a patient.

## 2019-08-10 NOTE — PLAN OF CARE
PRIMARY DIAGNOSIS: Laparoscopic Hysterectomy    OUTPATIENT/OBSERVATION GOALS TO BE MET BEFORE DISCHARGE:  1. Stable vital signs Yes  2. Tolerating diet:Yes  3. Pain controlled with oral pain medications:  Yes  4. Positive bowel sounds:  Yes  5. Voiding without difficulty:  Yes  6. Able to ambulate:  Yes  7. Provider specific discharge goals met:  Yes     Discharge Planner Nurse   Safe discharge environment identified: Yes  Barriers to discharge: No       Entered by: JJ MUSTAFA 08/10/2019    Patient A&O. VSS. Tolerated diet. Voided without difficulty. Had a BM. Received Oxycodone for mild pain and was effective. Ambulated independently. Incision dressing intact. Dried scant blood on dressing. Waiting for provider to see patient and discuss about discharging.     Please review provider order for any additional goals.   Nurse to notify provider when observation goals have been met and patient is ready for discharge.

## 2019-08-10 NOTE — PROGRESS NOTES
Doing well. Voiding well and had BM. Tolerating diet and ambulating.     vss afebrile  Abdomen soft and non tender  Incision dry and intact  Extremities nl    Hgb= 11.9  Glucose= 100    A: POD 1 s/p TLH-BS      Doing well  P: discharge home       Precautions reviewed       RTC 2-3 weeks

## 2019-08-10 NOTE — PLAN OF CARE
PRIMARY DIAGNOSIS: POST-OP (Total Laparoscopic Hysterectomy, Bilateral Salpingectomy)  OUTPATIENT/OBSERVATION GOALS TO BE MET BEFORE DISCHARGE:  1. Stable vital signs Yes  2. Tolerating diet:Yes  3. Pain controlled with oral pain medications:  Yes  4. Positive bowel sounds:  Yes  5. Voiding without difficulty:  Yes  6. Able to ambulate:  Yes  7. Provider specific discharge goals met:  Yes    Discharge Planner Nurse   Safe discharge environment identified: Yes  Barriers to discharge: No       Entered by: Ramila Martin 08/09/2019 9:52 PM     Please review provider order for any additional goals.   Nurse to notify provider when observation goals have been met and patient is ready for discharge.    Patient is Alert and Oriented x4. Vitals are Temp: 98.2  F (36.8  C) Temp src: Oral BP: (!) 147/80 Pulse: 111 Heart Rate: 111 Resp: 16 SpO2: 97 % O2 Device: None (Room air) Pt is complaining of 3/10 pain in their Abdomen.  Declines need for intervention.  Ice to abdomen. They are independent with no assistive devices .    Pt is on a Regular diet. Patient is Saline locked. Pt has N/T to right hand (index finger and middle finger). PCD in place. Cont pulse ox on. Plan: pain control (pt would like to discharge home with oxycodone instead of percocet), encourage IS.

## 2019-08-10 NOTE — OP NOTE
Procedure Date: 08/10/2019      PREOPERATIVE DIAGNOSES:   1.  Menometrorrhagia.   2.  Dysmenorrhea.      POSTOPERATIVE DIAGNOSES:   1.  Menometrorrhagia.   2.  Dysmenorrhea.      PROCEDURE:  Single port total laparoscopic hysterectomy, bilateral salpingectomy.      SURGEON:  Ny Jamison MD.      FIRST ASSISTANT:  Dr. Joe Willard.      ANESTHESIA:  General.      ESTIMATED BLOOD LOSS:  50 mL.      COMPLICATIONS:  None.      DESCRIPTION OF PROCEDURE:  The patient was given the risks and benefits and because of the ongoing pelvic pain and bleeding, she strongly desired to go ahead with the proposed surgery.  The patient has history of endometriosis and ovarian cyst diagnosed during previous laparoscopies.  The patient was brought to the operating room where adequate general anesthesia was administered by the Anesthesia team.  The patient was placed in the dorsal lithotomy position using Carlos stirrups.  Both arms were wrapped in foam and tucked along her sides.  The patient was prepped, a 3-minute wait time was observed.  The patient was then draped sterilely.  A timeout was taken.      A bivalve speculum was placed in the vagina.  The anterior lip of the cervix was grasped with a tenaculum.  The medium-sized VCare cup tip was placed into the uterus.  The balloon was filled with 5 mL of air.  The speculum was removed.  The VCare cup was advanced.  The tenaculum was removed.  The 2 VCare cups were pushed solidly against the cervix and tightened into place.  Hawley catheter was placed into the patient's bladder.  Clear urine was noted.  Gloves were changed.    A small curvilinear incision was made through the skin in the inferior aspect of the umbilicus where there was a previous scar.  The subcutaneous layer was dissected using both sharp and blunt dissection.  The fascia was identified.  0 Vicryl suture was placed through the fascia x2.  The sutures were elevated.  The fascia was incised sharply and extended both  superiorly and inferiorly sharply.  The peritoneum was entered bluntly.  The single port device was placed.  Adequate pneumoperitoneum was obtained using carbon dioxide gas.  The patient was placed in Trendelenburg.  The flexible scope was placed and operated by Dr. Willard.  Inspection revealed a mildly enlarged uterus, ovaries that appeared polycystic but normal, and normal-appearing tubes but both scarred to the ovaries.  There was no obvious endometriosis noted.  The round ligament on the right side was cauterized and cut with the Thunderbeat device.  The utero-ovarian tubal ligament was clamped, cauterized several times and cut.  The mesosalpinx was carefully taken down.  The bladder was partially dissected down using both sharp and blunt dissection.  There was some scarring from the previous  section along the peritoneum of the bladder and the attachment to the lower uterine segment.  The uterine artery and vein on the right side were identified and cauterized several times.  There was some bleeding from the exposed cut tube and utero-ovarian ligament on the uterus that was cauterized several times.  Hemostasis was noted.  The left side was then done in the same manner by Dr. Willard.  The round ligament was clamped, cauterized several times and cut.  The tubo-ovarian uterine ligament was cauterized and cut.  The mesosalpinx was taken down.  The uterine artery and vein were skeletonized.  The vesicouterine peritoneum was taken down using both sharp and blunt dissection.  The uterine vein and artery were cauterized several times and cut.  The bladder was carefully dissected down just below the Vcare cup edge.  The bladder pillars bilaterally were cauterized and cut.  The edge of the VCare cup was identified.  Using the Thunderbeat device.  The vagina was cut following the groove until the entire cervix and uterus was  from the vagina.  The uterus and cervix were removed vaginally.  A glove with  a moist lap in it was placed in the vagina to reestablish pneumoperitoneum.  Gloves were changed.      The vaginal cuff was closed using Endostitch 0 Vicryl.  Approximately 7 stitches were placed.  There was good support noted at this time.  The entire left tube was adherent to the surface of the left ovary.  It was carefully removed using the Thunderbeat device.  Some of fimbria remaining were cauterized.  The right tube was also removed.  This procedure was done by Dr. Joe Willard.  The tubes were sent to pathology.  The pelvis and abdomen were irrigated and suctioned with normal saline.  There was no bleeding noted.  The pneumoperitoneum was reduced.  Instruments were removed.      The fascial umbilical incision was closed using 0 Vicryl in a continuous nonlocking manner.  The skin was closed using 3-0 Monocryl subcuticularly.  Steri-Strips were placed.  Ten mL of 0.5% plain Marcaine was infiltrated underneath the umbilical incision.  The glove with a lap in the vagina was removed.  There was no bleeding noted.  The vaginal cuff was palpated and noted to be intact with no defects noted.  The Hawley was inspected.  Clear urine was noted to be within the Hawley catheter bag.  The patient was placed back in the supine position.  She was extubated without problems.  She was breathing spontaneously and moving all 4 extremities upon discharge from the operating room.  Sponge and needle counts were correct x3 at the end of the procedure.         TARA DAVID MD             D: 08/10/2019   T: 08/10/2019   MT: ELISHA      Name:     JOAQUIN OLMOS   MRN:      [id number]        Account:        [id number]   :      1975           Procedure Date: 08/10/2019      Document: [id number]       cc: Tara Clements PA-C

## 2019-08-10 NOTE — PROGRESS NOTES
Pt requested to go out and smoke. Writer strongly discouraged and educated against going to smoke. Pt still requested to go.  wheeled pt outside.

## 2019-08-10 NOTE — PLAN OF CARE
PRIMARY DIAGNOSIS: s/p lap hyst, BSO  OUTPATIENT/OBSERVATION GOALS TO BE MET BEFORE DISCHARGE:  1. Stable vital signs Yes  2. Tolerating diet:Yes  3. Pain controlled with oral pain medications:  Yes  4. Positive bowel sounds:  Yes  5. Voiding without difficulty:  Yes  6. Able to ambulate:  Yes  7. Provider specific discharge goals met:  Yes    Discharge Planner Nurse   Safe discharge environment identified: Yes  Barriers to discharge: No       Entered by: Yumiko Aquino 08/10/2019 12:57 AM    VSS, pt is A&Ox4, up independently, tolerating a general diet, SL, voiding adequately, abd lap site with steri-strip- old blood on dressing, tolerating oral pain meds, will continue to monitor       Please review provider order for any additional goals.   Nurse to notify provider when observation goals have been met and patient is ready for discharge.

## 2019-08-10 NOTE — PLAN OF CARE
PRIMARY DIAGNOSIS: s/p lap hyst with BSO  OUTPATIENT/OBSERVATION GOALS TO BE MET BEFORE DISCHARGE:  1. Stable vital signs Yes  2. Tolerating diet:Yes  3. Pain controlled with oral pain medications:  Yes  4. Positive bowel sounds:  Yes  5. Voiding without difficulty:  Yes  6. Able to ambulate:  Yes  7. Provider specific discharge goals met:  Yes    Discharge Planner Nurse   Safe discharge environment identified: Yes  Barriers to discharge: No       Entered by: Yumiko Aquino 08/10/2019 4:19 AM    VSS, pt is A&Ox4, up independently, SL, voiding adequately, tolerating oral pain meds, abd lap site C/D/I, tolerating a regular det, will continue to monitor        Please review provider order for any additional goals.   Nurse to notify provider when observation goals have been met and patient is ready for discharge.

## 2019-08-13 LAB — COPATH REPORT: NORMAL

## 2019-08-21 NOTE — OP NOTE
Procedure Date: 08/09/2019      Revised      PREOPERATIVE DIAGNOSES:   1.  Menometrorrhagia.   2.  Dysmenorrhea.       POSTOPERATIVE DIAGNOSES:   1.  Menometrorrhagia.   2.  Dysmenorrhea.       PROCEDURE:  Single port total laparoscopic hysterectomy, bilateral salpingectomy.       SURGEON:  Ny Jamison MD.       FIRST ASSISTANT:  Dr. Joe Willard.       ANESTHESIA:  General.       ESTIMATED BLOOD LOSS:  50 mL.       COMPLICATIONS:  None.       DESCRIPTION OF PROCEDURE:  The patient was given the risks and benefits and because of the ongoing pelvic pain and bleeding she strongly desired to go ahead with the proposed surgery.  The patient has a history of endometriosis and ovarian cyst diagnosed during previous laparoscopies.      The patient was brought to the operating room where adequate general anesthesia was administered by the anesthesia team.  The patient was placed in the dorsal lithotomy position using Carlos stirrups.  Both arms were wrapped in foam and tucked along her sides.  The patient was prepped.  A 3-minute wait time was observed.  The patient was then draped.  A timeout was taken.         A bivalve speculum was placed in the vagina.  The anterior lip of the cervix was grasped with a tenaculum.  The medium-sized VCare cup tip was placed into the uterus.  The balloon was filled with 5 mL of air.  The speculum was removed.  The VCare cup was advanced.  The tenaculum was removed.  The 2 VCare cups were pushed solidly against the cervix and tightened into place.  Hawley catheter was placed into the patient's bladder.  Clear urine was noted.  Gloves were changed.       A small curvilinear incision was made through the skin in the inferior aspect of the umbilicus where there was a previous scar.  The subcutaneous layer was dissected using both sharp and blunt dissection.  The fascia was identified.  0 Vicryl suture was placed through the fascia x2.  The sutures were elevated.  The fascia was incised sharply  and extended both superiorly and inferiorly sharply.  The peritoneum was entered bluntly.  The single port device was placed.  Adequate pneumoperitoneum was obtained using carbon dioxide gas.  The patient was placed in Trendelenburg.  The flexible scope was placed and operated by Dr. Willard.  Inspection revealed a mildly enlarged uterus, ovaries that appeared polycystic but normal, and normal-appearing tubes but both scarred to the ovaries.  There was no obvious endometriosis noted.  The round ligament on the right side was cauterized and cut with the Thunderbeat device.  The utero-ovarian tubal ligament was clamped, cauterized several times and cut.  The mesosalpinx was carefully taken down.  From the right side the vesicouterine peritoneum was taken down to the midline using both sharp and blunt dissection.  There was some scarring from the previous  section along the peritoneum of the bladder and the attachment to the lower uterine segment.  The uterine artery and vein on the right side were identified and cauterized several times.  There was some bleeding from the exposed cut tube and utero-ovarian ligament on the uterus that was cauterized several times.  Hemostasis was noted.  The left side was done in the same manner by Dr. Willard.  The round ligament was clamped, cauterized several times and cut.  The tubo-ovarian uterine ligament was cauterized and cut.  The mesosalpinx was taken down.  The uterine artery and vein were skeletonized.  From the left side the vesicouterine peritoneum was taken down to the midline using both sharp and blunt dissection.  The uterine vein and artery were cauterized several times and cut.  The bladder was carefully dissected down just below the Vcare cup edge.  The bladder pillars bilaterally were cauterized and cut.  The edge of the VCare cup was identified.  Using the Thunderbeat device.  The vagina was cut following the groove until the entire cervix and uterus was   from the vagina.  The uterus and cervix were removed vaginally.  A glove with a moist lap in it was placed in the vagina to reestablish pneumoperitoneum.  Gloves were changed.        The vaginal cuff was closed using Endostitch 0 Vicryl.  Approximately 7 stitches were placed.  There was good support noted at this time.  The entire left tube was adherent to the surface of the left ovary.  It was carefully removed using the Thunderbeat device.  Some of fimbria remaining were cauterized.  The right tube was also removed.  This procedure was done by Dr. Joe Willard.  The tubes were sent to pathology.  The pelvis and abdomen were irrigated and suctioned with normal saline.  There was no bleeding noted.  The pneumoperitoneum was reduced.  Instruments were removed.         The fascial umbilical incision was closed using 0 Vicryl in a continuous nonlocking manner.  The skin was closed using 3-0 Monocryl subcuticularly.  Steri-Strips were placed.  Ten mL of 0.5% plain Marcaine was infiltrated underneath the umbilical incision.  The glove with a lap in the vagina was removed.  There was no bleeding noted.  The vaginal cuff was palpated and noted to be intact with no defects noted.  The Hawley was inspected.  Clear urine was noted to be within the Hawley catheter bag.  The patient was placed back in the supine position.  She was extubated without problems.  She was breathing spontaneously and moving all 4 extremities upon discharge from the operating room.  Sponge and needle counts were correct x3 at the end of the procedure.      Revised procedure date lg 19                  TARA DAVID MD             D: 08/10/2019   T: 08/10/2019   MT: ELISHA      Name:     JOAQUIN OLMOS   MRN:      [id number]        Account:        [id number]   :      1975           Procedure Date: 2019      Document: [id number]       cc: Tara Clements PA-C

## 2021-05-27 ENCOUNTER — RECORDS - HEALTHEAST (OUTPATIENT)
Dept: ADMINISTRATIVE | Facility: CLINIC | Age: 46
End: 2021-05-27

## (undated) DEVICE — PACK LAP HYST RIDGES

## (undated) DEVICE — SPONGE LAP 18X18" X8435

## (undated) DEVICE — SU ENDO STITCH POLYSORB 0 48" 170052

## (undated) DEVICE — RETR ELEV / UTERINE MANIPULATOR V-CARE MED CUP 60-6085-201A

## (undated) DEVICE — ESU HANDPIECE BIPOLAR THUNDERBEAT FC 5MMX35CM TB-0535FC

## (undated) DEVICE — ESU PENCIL W/HOLSTER E2350H

## (undated) DEVICE — GLOVE PROTEXIS POWDER FREE SMT 8.0  2D72PT80X

## (undated) DEVICE — EVAC SYSTEM CLEAR FLOW SC082500

## (undated) DEVICE — SU VICRYL 0 UR-6 27" J603H

## (undated) DEVICE — DEVICE ENDO STITCH APPLIER 10MM 173016

## (undated) DEVICE — GLOVE PROTEXIS W/NEU-THERA 7.0  2D73TE70

## (undated) DEVICE — PAD CHUX UNDERPAD 30X36" P3036C

## (undated) DEVICE — SU MONOCRYL 3-0 SH 27" Y316H

## (undated) DEVICE — GLOVE PROTEXIS MICRO 6.5  2D73PM65

## (undated) DEVICE — LINEN HALF SHEET 5512

## (undated) DEVICE — BAG CLEAR TRASH 1.3M 39X33" P4040C

## (undated) DEVICE — LINEN FULL SHEET 5511

## (undated) DEVICE — LINEN POUCH DBL 5427

## (undated) DEVICE — SOL NACL 0.9% IRRIG 3000ML BAG 2B7477

## (undated) DEVICE — TROCAR TRIPORT PLUS OLYMPUS SINGLE ACCESS WA58010T

## (undated) DEVICE — ESU CORD MONOPOLAR 10'  E0510

## (undated) DEVICE — LINEN TOWEL PACK X5 5464

## (undated) DEVICE — GLOVE PROTEXIS W/NEU-THERA 7.5  2D73TE75

## (undated) DEVICE — SU MONOCRYL 4-0 PS-2 27" UND Y426H

## (undated) DEVICE — GOWN XLG DISP 9545

## (undated) RX ORDER — CEFAZOLIN SODIUM IN 0.9 % NACL 3 G/100 ML
INTRAVENOUS SOLUTION, PIGGYBACK (ML) INTRAVENOUS
Status: DISPENSED
Start: 2019-08-09

## (undated) RX ORDER — PROPOFOL 10 MG/ML
INJECTION, EMULSION INTRAVENOUS
Status: DISPENSED
Start: 2019-08-09

## (undated) RX ORDER — FENTANYL CITRATE 50 UG/ML
INJECTION, SOLUTION INTRAMUSCULAR; INTRAVENOUS
Status: DISPENSED
Start: 2019-08-09

## (undated) RX ORDER — GLYCOPYRROLATE 0.2 MG/ML
INJECTION INTRAMUSCULAR; INTRAVENOUS
Status: DISPENSED
Start: 2019-08-09

## (undated) RX ORDER — MINERAL OIL
OIL (ML) MISCELLANEOUS
Status: DISPENSED
Start: 2019-08-09

## (undated) RX ORDER — DEXAMETHASONE SODIUM PHOSPHATE 4 MG/ML
INJECTION, SOLUTION INTRA-ARTICULAR; INTRALESIONAL; INTRAMUSCULAR; INTRAVENOUS; SOFT TISSUE
Status: DISPENSED
Start: 2019-08-09

## (undated) RX ORDER — CEFAZOLIN SODIUM 1 G/3ML
INJECTION, POWDER, FOR SOLUTION INTRAMUSCULAR; INTRAVENOUS
Status: DISPENSED
Start: 2019-08-09

## (undated) RX ORDER — LIDOCAINE HYDROCHLORIDE 10 MG/ML
INJECTION, SOLUTION EPIDURAL; INFILTRATION; INTRACAUDAL; PERINEURAL
Status: DISPENSED
Start: 2019-08-09

## (undated) RX ORDER — ONDANSETRON 2 MG/ML
INJECTION INTRAMUSCULAR; INTRAVENOUS
Status: DISPENSED
Start: 2019-08-09

## (undated) RX ORDER — NEOSTIGMINE METHYLSULFATE 1 MG/ML
VIAL (ML) INJECTION
Status: DISPENSED
Start: 2019-08-09

## (undated) RX ORDER — BUPIVACAINE HYDROCHLORIDE 5 MG/ML
INJECTION, SOLUTION EPIDURAL; INTRACAUDAL
Status: DISPENSED
Start: 2019-08-09